# Patient Record
Sex: MALE | Employment: OTHER | URBAN - METROPOLITAN AREA
[De-identification: names, ages, dates, MRNs, and addresses within clinical notes are randomized per-mention and may not be internally consistent; named-entity substitution may affect disease eponyms.]

---

## 2019-11-19 ENCOUNTER — TELEPHONE (OUTPATIENT)
Dept: RHEUMATOLOGY | Age: 29
End: 2019-11-19

## 2019-11-19 NOTE — TELEPHONE ENCOUNTER
I called and confirmed with the wife on 11/19/2019 for their next day 11/20/2019 appointment to arrive 30 minutes before their appointment to fill out office paperwork patient was also told to please brings records. SDH.

## 2019-11-20 ENCOUNTER — OFFICE VISIT (OUTPATIENT)
Dept: RHEUMATOLOGY | Age: 29
End: 2019-11-20

## 2019-11-20 VITALS
DIASTOLIC BLOOD PRESSURE: 79 MMHG | SYSTOLIC BLOOD PRESSURE: 116 MMHG | HEART RATE: 93 BPM | BODY MASS INDEX: 24.36 KG/M2 | TEMPERATURE: 98.5 F | RESPIRATION RATE: 18 BRPM | HEIGHT: 71 IN | WEIGHT: 174 LBS

## 2019-11-20 DIAGNOSIS — M45.5 ANKYLOSING SPONDYLITIS OF THORACOLUMBAR REGION (HCC): Primary | ICD-10-CM

## 2019-11-20 DIAGNOSIS — H30.92 LEFT POSTERIOR UVEITIS: ICD-10-CM

## 2019-11-20 PROBLEM — M45.6 ANKYLOSING SPONDYLITIS OF LUMBAR REGION (HCC): Status: ACTIVE | Noted: 2019-11-20

## 2019-11-20 RX ORDER — IBUPROFEN 200 MG
400 TABLET ORAL
COMMUNITY
End: 2020-02-20

## 2019-11-20 NOTE — PROGRESS NOTES
Chief Complaint   Patient presents with    Other     Ankylosing spondylitis     Pt is a new patient to our office.

## 2019-11-20 NOTE — PATIENT INSTRUCTIONS
Adalimumab (By injection)   Adalimumab (aj-fy-RDZ-ue-mab)  Treats arthritis, plaque psoriasis, ankylosing spondylitis, Crohn disease, ulcerative colitis, hidradenitis suppurativa, and uveitis. Brand Name(s): Humira   There may be other brand names for this medicine. When This Medicine Should Not Be Used: This medicine is not right for everyone. Do not use it if you had an allergic reaction to adalimumab. How to Use This Medicine:   Injectable  · Your doctor will prescribe your exact dose and tell you how often it should be given. This medicine is given as a shot under your skin. · A nurse or other health provider will give you this medicine. · You may be taught how to give your medicine at home. Make sure you understand all instructions before giving yourself an injection. Do not use more medicine or use it more often than your doctor tells you to. · You will be shown the body areas where this shot can be given. Use a different body area each time you give yourself a shot. Keep track of where you give each shot to make sure you rotate body areas. Do not inject into skin areas that are red, bruised, tender, or hard. If you have psoriasis, do not inject into a raised, thick, red, or scaly skin patch or into skin lesions. · This medicine should come with a Medication Guide. Ask your pharmacist for a copy if you do not have one. · Missed dose: Take a dose as soon as you remember. If it is almost time for your next dose, wait until then and take a regular dose. Do not take extra medicine to make up for a missed dose. · If you store this medicine at home, keep it in the refrigerator. Do not freeze. Protect the medicine from light. Keep your medicine and supplies in the original packages until you are ready to use them. Drugs and Foods to Avoid:   Ask your doctor or pharmacist before using any other medicine, including over-the-counter medicines, vitamins, and herbal products.   · Some foods and medicines can affect how adalimumab works. Tell your doctor if you are using any of the following:   ¨ Abatacept, anakinra, azathioprine, cyclosporine, mercaptopurine, rituximab, theophylline  ¨ A blood thinner (including warfarin)  ¨ Medicine that weakens the immune system (including a steroid or cancer medicine)  · This medicine may interfere with vaccines. Ask your doctor before you get a flu shot or any other vaccines. Warnings While Using This Medicine:   · Tell your doctor if you are pregnant or breastfeeding, or if you have liver disease, a history of cancer, COPD, heart failure, diabetes, psoriasis, multiple sclerosis, optic neuritis, problems with your immune system, or a history of Guillain-Barré syndrome. Tell your doctor if you have any type of infection (such as hepatitis B or tuberculosis) or an infection that keeps coming back. · This medicine may cause the following problems:   ¨ Increased risk for infection  ¨ Increased risk of certain cancers, such as lymphoma or leukemia  ¨ New or worsening heart failure  · Tell your doctor if you have a latex allergy. The needle cover of the syringe contains latex and may cause allergic reactions. · You will need to have a skin test for tuberculosis (TB) before you start this medicine. Tell your doctor if you or anyone in your home has ever had a positive TB skin test or been exposed to TB. · This medicine may make you bleed, bruise, or get infections more easily. Take precautions to prevent illness and injury. Wash your hands often. · Your doctor will do lab tests at regular visits to check on the effects of this medicine. Keep all appointments. · Throw away used needles in a hard, closed container that the needles cannot poke through. Keep this container away from children and pets. · Keep all medicine out of the reach of children. Never share your medicine with anyone.   Possible Side Effects While Using This Medicine:   Call your doctor right away if you notice any of these side effects:  · Allergic reaction: Itching or hives, swelling in your face or hands, swelling or tingling in your mouth or throat, chest tightness, trouble breathing  · Blistering, peeling, red skin rash, or red, scaly patches on the skin  · Change in how much or how often you urinate, painful urination  · Changes in vision  · Chest pain, uneven heartbeat, trouble breathing  · Cough, fever, chills, runny or stuffy nose, sore throat, and body aches  · Dark urine or pale stools, nausea, vomiting, loss of appetite, stomach pain, yellow skin or eyes  · Numbness, tingling, or burning pain in your hands, arms, legs, or feet, or joint pain  · Rapid weight gain, swelling in your hands, ankles, lower legs, or feet  · Sores or white patches on your lips, mouth, or throat  · Swollen glands in your neck, underarms, or groin  · Unusual bleeding, bruising, tiredness, weakness, or weight loss  If you notice these less serious side effects, talk with your doctor:   · Back pain  · Headache  · Redness, itching, bruising, bleeding, pain, or swelling where the shot was given  If you notice other side effects that you think are caused by this medicine, tell your doctor. Call your doctor for medical advice about side effects. You may report side effects to FDA at 3-317-FDA-4767  © 2017 [employer] Information is for End User's use only and may not be sold, redistributed or otherwise used for commercial purposes. The above information is an  only. It is not intended as medical advice for individual conditions or treatments. Talk to your doctor, nurse or pharmacist before following any medical regimen to see if it is safe and effective for you.

## 2019-11-20 NOTE — PROGRESS NOTES
REASON FOR VISIT    This is the initial evaluation for Mr. Sinclair Mad a 30 y.o.  male for question of a seronegative spondyloarthritis. The patient is self-referred to the [facility]. HISTORY OF PRESENT ILLNESS     I have reviewed and summarized old records from none. In 2012, he developed left eye inflammation and photosensitivity. He saw ophthalmology and was diagnosed with uveitis and was treated with topical steroids. He had blood work and was told may have ankylosing spondylitis. He has not had recurrence of uveitis since then. In 2013, he developed lower back aching pain and stiffness worse in the morning and evening lasting 3 to 4 hours that would wake him up at night. At 5am it wakes up him due to the severity. He has tightness in his middle back lumbar spine associated with pain as well. His symptoms are worse with rest and better with activity and exercise. He thought it was mattress issues, so changed his mattress without relief. Ibuprofen helps    He denies psoriasis    Family history is negative    Therapy History includes:    Current NSAIDs include: iburpofen  Current DMARD include: none  Prior DMARD therapy includes: none  The following DMARDs have been ineffective: none  The following DMARDs were stopped because of side effects: none    REVIEW OF SYSTEMS    A 15 point review of systems was performed and summarized below. The questionnaire was reviewed with the patient and scanned into the patient's medical record.     General: denies recent weight gain, recent weight loss, fatigue, weakness, fever, drenching night sweats  Musculoskeletal: endorses back pain, morning stiffness (lasting 3-4 hours), muscle pain, denies joint pain, joint swelling  Ears: denies ringing in ears, hearing loss, deafness  Eyes: denies pain, light sensitive, redness, blindness, double vision, blurred vision, excess tearing, dryness, foreign body sensation  Mouth: denies sore tongue, oral ulcers, loss of taste, dryness, increased dental caries  Nose: denies nosebleeds, nasal ulcers  Throat: denies food stuck when swallowing, difficulty with swallowing, hoarseness, pain in jaw while chewing  Neck: denies swollen glands, tender glands  Cardiopulmonary: denies pain in chest with deep breaths, pain in chest when lying down, murmurs, sudden changes in heart beat, wheezing, dry cough, productive cough, shortness of breath at rest, shortness of breath on exertion, coughing of blood  Gastrointestinal: denies nausea, heartburn, stomach pain relieved by food, chronic constipation, chronic diarrhea, blood in stools, black stools  Genitourinary: denies vaginal dryness, pain or burning on urination, blood in urine, cloudy urine, penile ulcers  Hematologic: denies anemia, bleeding tendency, blood clots, bleeding gums  Skin: denies easy bruising, hair loss, rash, rash worsened after sun exposure, hives/urticaria, skin thickening, skin tightness, nodules/bumps, color changes of hands or feet in the cold (Raynaud's)  Neurologic: denies numbness or tingling in hands, numbness or tingling in feet, muscle weakness  Psychiatric: denies depression, excessive worries, PTSD, Bipolar  Sleep: endorses poor sleep (8 hours), difficulty staying asleep , denies snoring, apnea, daytime somnolence, difficulty falling asleep    PAST MEDICAL HISTORY    He has a past medical history of Ankylosing spondylitis ([facility] [address] and Uveitis. FAMILY HISTORY    His family history includes Heart Disease in his father; Hypertension in his father; No Known Problems in his brother, mother, sister, and sister. SOCIAL HISTORY    He reports that he has never smoked. He has never used smokeless tobacco. He reports current alcohol use. He reports that he does not use drugs. HEALTH MAINTENANCE    Immunizations    There is no immunization history on file for this patient.     MEDICATIONS    Current Outpatient Medications   Medication Sig Dispense Refill  ibuprofen (MOTRIN) 200 mg tablet Take 400 mg by mouth every eight (8) hours as needed for Pain.  adalimumab (HUMIRA,CF, PEN) 40 mg/0.4 mL pnkt 40 mg by SubCUTAneous route every fourteen (14) days. Indications: rheumatoid arthritis 2 Kit 11      ALLERGIES    No Known Allergies    PHYSICAL EXAMINATION    Visit Vitals  /79   Pulse 93   Temp 98.5 °F (36.9 °C)   Resp 18   Ht 5' 11\" (1.803 m)   Wt 174 lb (78.9 kg)   BMI 24.27 kg/m²     Body mass index is 24.27 kg/m². General: Patient is alert, oriented x 3, not in acute distress    HEENT:   Conjunctiva are not injected and appear moist, oral mucous membranes are moist, there are no ulcers present, there is no alopecia, neck is supple, there is no lymphadenopathy    Cardiovascular:  Heart is regular rate and rhythm, no murmurs. Chest:  Lungs are clear to auscultation bilaterally. Extremities:  Free of clubbing, cyanosis, edema, extremities well perfused. Neurological:  Muscle strength is full in upper and lower extremities. Skin:    Psoriasis:     no  Nail Pitting:     no  Onycholysis:     no  Palmoplantar pustulosis:   no  Acne fulminans:    no  Acne conglobata:    no  Hidradenitis Suppurativa:   no  Dissecting cellulitis of the scalp:  no  Pilonidal sinus:    no  Erythema nodosum:    no    Musculoskeletal:  A comprehensive musculoskeletal exam was performed for all joints of each upper and lower extremity and assessed for swelling, tenderness and range of motion.       Mild thoracic kyphosis  Midlumbar spine tenderness to palpation  No lumbar or SI joint tenderness    SCOTT: negative  Straight Leg Raise: negative    Costochondritis:  no   Synovitis:   no  Dactylitis:   no  Enthesitis:   no    ANTHROPOMETRICS    Occiput to Wall:   2.5 cm (reference 0 cm)  Tragus to Wall (Right):  17 cm (reference <15 cm)  Tragus to Wall (Left):   17 cm (reference <15 cm)  Lateral Lumbar Flexion (Right): 19 cm (reference >10 cm)  Lateral Lumbar Flexion (Left):  14 cm (reference >10 cm)  Cervical Rotation (Right):  80 degrees (reference >70 degrees)  Cervical Rotation (Left):  80 degrees (reference >70 degrees)  Chest Expansion:   10 cm (reference >2 cm)  Intermalleolar distance:  >120 cm (reference >100 cm)  Modified Schober:   7 cm  (reference >=4 cm)    DATA REVIEW    Prior medical records were reviewed and are summarized as below:    Laboratory data: summarized in the HPI    Imaging: summarized in the HPI. ASSESSMENT AND PLAN    1) Ankylosing Spondylitis. (thoracic and lumbar, left uveitis) He has a several year history of inflammatory back pain and one episode of left uveitis. He has been taking ibuprofen with benefit but continues to have active symptoms that wake him up night. Biologic therapy with anti-TNF have been shown to significantly improve disease activity and function in ankylosing spondylitis patients (Jolynn QUEZADA et al. Gemma Levy Rheum Dis. Efficacy of TNF? blockers in patients with ankylosing spondylitis and non-radiographic axial spondyloarthritis: a meta-analysis. 2015;74(6):1241). I discussed with him about advancing therapy to a biologic (anti-TNF). We discussed the potential adverse effects, which include infections, such as upper respiratory infections, latent TB reactivation, viral hepatitis activation, and routes of administration (infusion versus subcutaneous). The patient was informed about the low risk for lymphoma based on at least 5 years of post-market data and the likely inherent relation between chronic autoimmune diseases, such as Rheumatoid Arthritis, and lymphoma. The patient was informed these medications co-pay are subject to the patient's insurance coverage and we will not know until it has been submitted to the insurance company. Due to his history of uveitis, I recommended Humira. I ordered labs and radiographs today. I referred him to physical therapy. 2) Left Uveitis. This was not an active issue today.     3) Long Term Use of Immunosuppressants. The patient remains on immunomodulatory medications (Humira) and requires frequent toxicity monitoring by blood work. The patient voiced understanding of the aforementioned assessment and plan. Summary of plan was provided in the After Visit Summary patient instructions. I also provided education about MyChart setup and utility.     TODAY'S ORDERS    Orders Placed This Encounter    QUANTIFERON-TB GOLD PLUS    XR SPINE LUMB 2 OR 3 V    XR SPINE THORAC 2 V    XR SI JTS MIN 3 V    CHRONIC HEPATITIS PANEL    PROTEIN ELECTROPHORESIS W/ REFLX RANDI    CBC WITH AUTOMATED DIFF    METABOLIC PANEL, COMPREHENSIVE    C REACTIVE PROTEIN, QT    SED RATE (ESR)    URIC ACID    REFERRAL TO PHYSICAL THERAPY    adalimumab (HUMIRA,CF, PEN) 40 mg/0.4 mL pnkt     Future Appointments   Date Time Provider Andre Cony   2/20/2020  3:20 PM MD Jose Alfredo Cantrell MD, [address] [place] [address]    Adult Rheumatology   Rheumatology Ultrasound Certified  [address]  [facility], [address] [place] [address]   Phone [phone]  Fax [phone]

## 2019-11-24 LAB
ALBUMIN SERPL ELPH-MCNC: 3.9 G/DL (ref 2.9–4.4)
ALBUMIN SERPL-MCNC: 4.6 G/DL (ref 3.5–5.5)
ALBUMIN/GLOB SERPL: 1.1 {RATIO} (ref 0.7–1.7)
ALBUMIN/GLOB SERPL: 1.6 {RATIO} (ref 1.2–2.2)
ALP SERPL-CCNC: 79 IU/L (ref 39–117)
ALPHA1 GLOB SERPL ELPH-MCNC: 0.2 G/DL (ref 0–0.4)
ALPHA2 GLOB SERPL ELPH-MCNC: 0.7 G/DL (ref 0.4–1)
ALT SERPL-CCNC: 43 IU/L (ref 0–44)
AST SERPL-CCNC: 27 IU/L (ref 0–40)
B-GLOBULIN SERPL ELPH-MCNC: 1.4 G/DL (ref 0.7–1.3)
BASOPHILS # BLD AUTO: 0 X10E3/UL (ref 0–0.2)
BASOPHILS NFR BLD AUTO: 0 %
BILIRUB SERPL-MCNC: 0.3 MG/DL (ref 0–1.2)
BUN SERPL-MCNC: 12 MG/DL (ref 6–20)
BUN/CREAT SERPL: 13 (ref 9–20)
CALCIUM SERPL-MCNC: 9.6 MG/DL (ref 8.7–10.2)
CHLORIDE SERPL-SCNC: 101 MMOL/L (ref 96–106)
CO2 SERPL-SCNC: 23 MMOL/L (ref 20–29)
COMMENT, 144067: NORMAL
CREAT SERPL-MCNC: 0.92 MG/DL (ref 0.76–1.27)
CRP SERPL-MCNC: 2 MG/L (ref 0–10)
EOSINOPHIL # BLD AUTO: 0.3 X10E3/UL (ref 0–0.4)
EOSINOPHIL NFR BLD AUTO: 4 %
ERYTHROCYTE [DISTWIDTH] IN BLOOD BY AUTOMATED COUNT: 12.8 % (ref 12.3–15.4)
ERYTHROCYTE [SEDIMENTATION RATE] IN BLOOD BY WESTERGREN METHOD: 10 MM/HR (ref 0–15)
GAMMA GLOB SERPL ELPH-MCNC: 1.4 G/DL (ref 0.4–1.8)
GAMMA INTERFERON BACKGROUND BLD IA-ACNC: 0.01 IU/ML
GLOBULIN SER CALC-MCNC: 2.9 G/DL (ref 1.5–4.5)
GLOBULIN SER CALC-MCNC: 3.6 G/DL (ref 2.2–3.9)
GLUCOSE SERPL-MCNC: 91 MG/DL (ref 65–99)
HBV CORE AB SERPL QL IA: NEGATIVE
HBV CORE IGM SERPL QL IA: NEGATIVE
HBV E AB SERPL QL IA: NEGATIVE
HBV E AG SERPL QL IA: NEGATIVE
HBV SURFACE AB SER QL: REACTIVE
HBV SURFACE AG SERPL QL IA: NEGATIVE
HCT VFR BLD AUTO: 44.7 % (ref 37.5–51)
HCV AB S/CO SERPL IA: <0.1 S/CO RATIO (ref 0–0.9)
HGB BLD-MCNC: 15.2 G/DL (ref 13–17.7)
IMM GRANULOCYTES # BLD AUTO: 0 X10E3/UL (ref 0–0.1)
IMM GRANULOCYTES NFR BLD AUTO: 0 %
LYMPHOCYTES # BLD AUTO: 2.3 X10E3/UL (ref 0.7–3.1)
LYMPHOCYTES NFR BLD AUTO: 36 %
M PROTEIN SERPL ELPH-MCNC: ABNORMAL G/DL
M TB IFN-G BLD-IMP: NEGATIVE
M TB IFN-G CD4+ BCKGRND COR BLD-ACNC: 0.01 IU/ML
MCH RBC QN AUTO: 30.1 PG (ref 26.6–33)
MCHC RBC AUTO-ENTMCNC: 34 G/DL (ref 31.5–35.7)
MCV RBC AUTO: 89 FL (ref 79–97)
MITOGEN IGNF BLD-ACNC: >10 IU/ML
MONOCYTES # BLD AUTO: 0.5 X10E3/UL (ref 0.1–0.9)
MONOCYTES NFR BLD AUTO: 7 %
NEUTROPHILS # BLD AUTO: 3.4 X10E3/UL (ref 1.4–7)
NEUTROPHILS NFR BLD AUTO: 53 %
PLATELET # BLD AUTO: 296 X10E3/UL (ref 150–450)
PLEASE NOTE, 011150: ABNORMAL
POTASSIUM SERPL-SCNC: 4.5 MMOL/L (ref 3.5–5.2)
PROT PATTERN SERPL ELPH-IMP: ABNORMAL
PROT SERPL-MCNC: 7.5 G/DL (ref 6–8.5)
QUANTIFERON INCUBATION, QF1T: NORMAL
QUANTIFERON TB2 AG: 0.01 IU/ML
RBC # BLD AUTO: 5.05 X10E6/UL (ref 4.14–5.8)
SERVICE CMNT-IMP: NORMAL
SODIUM SERPL-SCNC: 140 MMOL/L (ref 134–144)
URATE SERPL-MCNC: 6.9 MG/DL (ref 3.7–8.6)
WBC # BLD AUTO: 6.4 X10E3/UL (ref 3.4–10.8)

## 2019-12-02 ENCOUNTER — HOSPITAL ENCOUNTER (OUTPATIENT)
Dept: GENERAL RADIOLOGY | Age: 29
Discharge: HOME | End: 2019-12-02
Payer: COMMERCIAL

## 2019-12-02 DIAGNOSIS — M45.5 ANKYLOSING SPONDYLITIS OF THORACOLUMBAR REGION (HCC): ICD-10-CM

## 2019-12-02 PROCEDURE — 72100 X-RAY EXAM L-S SPINE 2/3 VWS: CPT

## 2019-12-02 PROCEDURE — 72202 X-RAY EXAM SI JOINTS 3/> VWS: CPT

## 2019-12-02 PROCEDURE — 72070 X-RAY EXAM THORAC SPINE 2VWS: CPT

## 2019-12-10 ENCOUNTER — DOCUMENTATION ONLY (OUTPATIENT)
Dept: PHARMACY | Age: 29
End: 2019-12-10

## 2019-12-10 NOTE — PROGRESS NOTES
[facility] [address] [facility] ER Update    Date: 12/10/19    Kyle Silva 1990     Medication: Humira CF 40mg/0.4mL pen    Co-pay = $192 ($0 with Manufacture Credit Card)    Fill: 11/27/19    Picked up in the pharmacy: 12/2/19    Next Fill Due: 12/18/19    Pharmacist counseled the patient on:        - Use  - Dosing  - Administration  - Side effects    Handout was provided.     Radha Soto, [address] at [facility], [address] [place] [address], [address]  phone: [phone]   fax: [phone]

## 2020-01-30 ENCOUNTER — DOCUMENTATION ONLY (OUTPATIENT)
Dept: PHARMACY | Age: 30
End: 2020-01-30

## 2020-01-30 NOTE — PROGRESS NOTES
[facility] at [address] [facility] Update     Date: 1/27/2020     Santinoisauro Donovan 1990      Medication: Humira CF 40mg/0.4mL pen     Co-pay = $5     Fill: 1/27/2020     Picked up in the pharmacy: 1/27/2020     Next Fill Due: 2/17/2020     Pharmacist offered counseling.     Sushant Burnett, [address] at [facility],  [place], [address]  phone: [phone]   fax: [phone]

## 2020-02-20 ENCOUNTER — OFFICE VISIT (OUTPATIENT)
Dept: RHEUMATOLOGY | Age: 30
End: 2020-02-20

## 2020-02-20 VITALS
HEART RATE: 79 BPM | TEMPERATURE: 98 F | DIASTOLIC BLOOD PRESSURE: 70 MMHG | WEIGHT: 176 LBS | HEIGHT: 71 IN | RESPIRATION RATE: 18 BRPM | SYSTOLIC BLOOD PRESSURE: 109 MMHG | BODY MASS INDEX: 24.64 KG/M2

## 2020-02-20 DIAGNOSIS — M45.5 ANKYLOSING SPONDYLITIS OF THORACOLUMBAR REGION (HCC): Primary | ICD-10-CM

## 2020-02-20 DIAGNOSIS — H30.92 LEFT POSTERIOR UVEITIS: ICD-10-CM

## 2020-02-20 DIAGNOSIS — Z79.60 LONG-TERM USE OF IMMUNOSUPPRESSANT MEDICATION: ICD-10-CM

## 2020-02-20 NOTE — PROGRESS NOTES
Chief Complaint   Patient presents with    Other     Ankylosing spondylitis     1. Have you been to the ER, urgent care clinic since your last visit? Hospitalized since your last visit? No    2. Have you seen or consulted any other health care providers outside of the [address] [place] [address] since your last visit? Include any pap smears or colon screening.  No

## 2020-02-20 NOTE — Clinical Note
2/20/20 Patient: Tasha Carlton YOB: 1990 Date of Visit: 2/20/2020 None None ([phone]) Patient Stated That They Have No Pcp 
VIA Dear None, Thank you for referring Mr. Tasha Carlton to [facility] for evaluation. My notes for this consultation are attached. If you have questions, please do not hesitate to call me. I look forward to following your patient along with you.  
 
 
Sincerely, 
 
Vita Weinstein MD

## 2020-02-20 NOTE — PROGRESS NOTES
REASON FOR VISIT    This is a follow-up visit for Mr. Isaak Varela for     ICD-10-CM   1. Ankylosing spondylitis of thoracolumbar region ([facility] M45.5   2. Left posterior uveitis H30.92     Spondyloarthritis phenotype includes:  Anti-CCP positive: N/A  Rheumatoid factor positive: N/A  HLA-B27 positive: N/A  Inflammatory Back Pain: yes  Erosive disease: no  Sacroiliitis: no  Ankylosis: no  Psoriasis: no  Enthesitis: no  Dactylitis: no  Nail Pitting: no  Onycholysis: no  Splinter Hemorrhage: no  Extra-articular manifestations include: uveitis  SAPHO: no    Immunosuppression Screening (11/20/2019): Quantiferon TB: negative  PPD:  Not performed  Hepatitis B: negative  Hepatitis C: negative    Therapy History includes:  Prior NSAIDs include: ibuprofen  Current NSAIDs include: none  Current DMARD therapy include: Humira 40 mg every other Tuesday (12/10/2019 to present)  Prior DMARD therapy include: None  Discontinued DMARDs because of inefficacy: None  Discontinued DMARDs because of side effects: None    Active problems include:    Patient Active Problem List   Diagnosis Code    Ankylosing spondylitis of thoracolumbar region ([facility] M45.5    Left posterior uveitis H30.92     HISTORY OF PRESENT ILLNESS    Mr. Isaak Varela returns for a follow-up visit. On his last visit, I started Humira 40 mg every other week for ankylosing spondylitis with uveitis, which he has taken with good tolerance. He denies breakthrough. I also referred him to physical therapy but he did not go. Today, he feels great. He denies back pain or stiffness. He no longer waking up at night due to pain. He denies interval uveitis flares. He no longer needs ibuprofen.      He denies fever, weight loss, blurred vision, vision loss, oral ulcers, ankle swelling, dry cough, dyspnea, nausea, vomiting, dysphagia, abdominal pain, black or bloody stool, fall since last visit, rash, easy bruising and increased thirst.    Mr. Isaak Varela has continued his medications for arthritis and reports good tolerance without significant side effects. Last toxicity monitoring by blood work was done on 11/20/2019 and did not reveal any significant adverse effects. Most recent inflammatory markers from 11/20/2019 revealed a ESR 10 mm/hr and CRP 2 mg/L. The patient has not had any interval hospital admissions, infections, or surgeries. REVIEW OF SYSTEMS    A comprehensive review of systems was performed and pertinent results are documented in the HPI, review of systems is otherwise non-contributory. PAST MEDICAL HISTORY    He has a past medical history of Ankylosing spondylitis ([address] and Uveitis. FAMILY HISTORY    His family history includes Heart Disease in his father; Hypertension in his father; No Known Problems in his brother, mother, sister, and sister. SOCIAL HISTORY    He reports that he has never smoked. He has never used smokeless tobacco. He reports current alcohol use. He reports that he does not use drugs. IMMUNIZATIONS    There is no immunization history on file for this patient. MEDICATIONS    Current Outpatient Medications   Medication Sig Dispense Refill    adalimumab (HUMIRA,CF, PEN) 40 mg/0.4 mL pnkt 40 mg by SubCUTAneous route every fourteen (14) days. Indications: rheumatoid arthritis 2 Kit 11        ALLERGIES    No Known Allergies    PHYSICAL EXAMINATION    Visit Vitals  /70   Pulse 79   Temp 98 °F (36.7 °C)   Resp 18   Ht 5' 11\" (1.803 m)   Wt 176 lb (79.8 kg)   BMI 24.55 kg/m²     Body mass index is 24.55 kg/m². General: Patient is alert, oriented x 3, not in acute distress    HEENT:   Sclerae are not injected and appear moist.  There is no alopecia. Cardiovascular:  Heart is regular rate and rhythm, no murmurs. Chest:  Lungs are clear to auscultation bilaterally. No rhonchi, wheezes, or crackles.     Extremities:  Free of clubbing, cyanosis, edema    Neurological exam:  Muscle strength is full in upper and lower extremities Skin:    Psoriasis:     no  Nail Pitting:     no  Onycholysis:     no  Splinter Hemorrhage:   no  Palmoplantar pustulosis:   no  Acne fulminans:    no  Acne conglobata:    no  Hidradenitis Suppurativa:   no  Dissecting cellulitis of the scalp:  no  Pilonidal sinus:    no  Erythema nodosum:    no    Musculoskeletal:  A comprehensive musculoskeletal exam was performed for all joints of each upper and lower extremity and assessed for swelling, tenderness and range of motion.       Mild thoracic kyphosis  Midlumbar spine tenderness to palpation  No lumbar or SI joint tenderness     SCOTT: negative  Straight Leg Raise: negative     Costochondritis:  no   Synovitis:   no  Dactylitis:   no  Enthesitis:   No    ANTHROPOMETRICS   (11/20/2019)    Occiput to Wall:                                   2.5 cm (reference 0 cm)  Tragus to Wall (Right):                        17 cm (reference <15 cm)  Tragus to Wall (Left):                          17 cm (reference <15 cm)  Lateral Lumbar Flexion (Right):          19 cm (reference >10 cm)  Lateral Lumbar Flexion (Left):                        14 cm (reference >10 cm)  Cervical Rotation (Right):                   80 degrees (reference >70 degrees)  Cervical Rotation (Left):                      80 degrees (reference >70 degrees)  Chest Expansion:                                10 cm (reference >2 cm)  Intermalleolar distance:                      >120 cm (reference >100 cm)  Modified Schober:                               7 cm  (reference >=4 cm)      ANTHROPOMETRICS  (2/20/2020)    Occiput to Wall:   12 cm (reference 0 cm)  Tragus to Wall (Right):  12 cm (reference <15 cm)  Tragus to Wall (Left):   0 cm (reference <15 cm)  Lateral Lumbar Flexion (Right): 23 cm (reference >10 cm)  Lateral Lumbar Flexion (Left):  23 cm (reference >10 cm)  Cervical Rotation (Right):  90 degrees (reference >70 degrees)  Cervical Rotation (Left):  90 degrees (reference >70 degrees)  Chest Expansion:   10 cm (reference >2 cm)  Intermalleolar distance:  120 cm (reference >100 cm)  Modified Schober:   7 cm  (reference >=4 cm)      DATA REVIEW    Laboratory     Recent laboratory results were reviewed, summarized, and discussed with the patient. Imaging    Musculoskeletal Ultrasound    None    Radiographs    Thoracic Spine 12/02/2019: There is no acute fracture or subluxation. Vertebral body heights and intervertebral disc spaces are maintained. There are no osteophytes or syndesmophytes. There is no abnormality in alignment. Lumbar Spine 12/02/2019: There is no acute fracture or subluxation. Vertebral body heights and intervertebral disc spaces are maintained. There are no osteophytes or syndesmophytes. There is no abnormality in alignment. Sacroiliac Joint 12/02/2019: no abnormality.        CT Imaging    None    MR Imaging    None    DXA     None    ASSESSMENT AND PLAN    This is a follow-up visit for Mr. Matilde Tuttle. 1) Ankylosing Spondylitis. (thoracic and lumbar, left uveitis) He is doing well on Humira 40 mg every 14 days with good tolerance and no breakthrough. He no longer has inflammantory back pain. He has not had any epidoes of uveitis. His anthropometrics have improved. I will continue treatment. I had referred him to physical therapy but he did not go. 2) Left Uveitis. This was not an active issue today. 3) Long Term Use of Immunosuppressants. The patient remains on immunomodulatory medications (Humira) and requires frequent toxicity monitoring by blood work. The patient voiced understanding of the aforementioned assessment and plan. Summary of plan was provided in the After Visit Summary patient instructions. TODAY'S ORDERS    No orders of the defined types were placed in this encounter.     Future Appointments   Date Time Provider Department Center   8/20/2020  3:20 PM Marely Madrigal MD [place], MD, [address] [facility]    Adult Rheumatology Rheumatology Ultrasound Certified  [facility]  A Part of [facility], [address]   Phone [phone]  Fax [phone]

## 2020-02-21 ENCOUNTER — DOCUMENTATION ONLY (OUTPATIENT)
Dept: PHARMACY | Age: 30
End: 2020-02-21

## 2020-02-21 NOTE — PROGRESS NOTES
[facility] at [address] [facility] Update     Date: 2/17/2020     Kristine Mikey Graham 1990      Medication: Humira CF 40mg/0.4mL pen     Co-pay = $5     Fill: 2/19/2020     Picked up in the pharmacy: 2/19/2020     Next Fill Due: 3/9/2020     Pharmacist offered counseling.     Mer Diallo, [address] at [facility], [address] [place] [address], [address]  phone: [phone]   fax: [phone]

## 2020-03-13 ENCOUNTER — DOCUMENTATION ONLY (OUTPATIENT)
Dept: PHARMACY | Age: 30
End: 2020-03-13

## 2020-03-13 NOTE — PROGRESS NOTES
[facility] at [address] [facility] Update     Date: 3/12/2020     Cisco Graham 1990      Medication: Humira CF 40mg/0.4mL pen     Co-pay = $5     Fill: 3/9/2020     Ship: 3/11/2020    Deliver: 3/12/2020     Next Fill Due: 3/30/2020     Pharmacist offered counseling.     Armando Kraus, [address] [facility] at [facility],  Mansi Tavera   [address] [place] [address] Avenue  phone: [phone]   fax: [phone]

## 2020-04-03 ENCOUNTER — DOCUMENTATION ONLY (OUTPATIENT)
Dept: PHARMACY | Age: 30
End: 2020-04-03

## 2020-04-03 NOTE — PROGRESS NOTES
[facility] at [address] [facility] Update     Date: 4/1/2020     Karla Graham 1990      Medication: Humira CF 40mg/0.4mL pen     Co-pay = $5     Fill: 3/26/2020     Ship: 3/31/2020     Deliver: 4/1/2020     Next Fill Due: 4/19/2020     Pharmacist offered counseling.     Negrito Olmedo, [address] at [employer] [address] [place], [facility] [address]  phone: [phone]   fax: [phone]

## 2020-05-06 ENCOUNTER — DOCUMENTATION ONLY (OUTPATIENT)
Dept: PHARMACY | Age: 30
End: 2020-05-06

## 2020-05-06 NOTE — PROGRESS NOTES
[facility] at [address] [facility] Update     Date: 4/28/2020     Marry Graham 1990      Medication: Humira CF 40mg/0.4mL pen     Co-pay = $192 (used [employer] Card)     Fill: 4/16/2020     Ship: 4/27/2020     Deliver: 4/28/2020     Next Fill Due: 5/18/2020     Pharmacist offered counseling.     Tiffany Calderon, [address] at [facility], [address] [place] [address], [address]  phone: [phone]   fax: [phone]

## 2020-06-04 ENCOUNTER — DOCUMENTATION ONLY (OUTPATIENT)
Dept: PHARMACY | Age: 30
End: 2020-06-04

## 2020-07-06 ENCOUNTER — DOCUMENTATION ONLY (OUTPATIENT)
Dept: PHARMACY | Age: 30
End: 2020-07-06

## 2020-07-06 NOTE — PROGRESS NOTES
[facility] at [address] [facility] Update     Date: 6/30/2020     Socorromeg Barronclaude Graham 1990      Medication: Humira CF 40mg/0.4mL pen     Co-pay = $192 (used [employer] Credit Card)     Fill: 6/19/2020     Ship: 6/29/2020     Deliver: 6/30/2020     Next Fill Due: 7/20/2020     Pharmacist offered counseling.     Toya Kapoor, [address] at [facility],  [place], [address]  phone: [phone]   fax: [phone]

## 2020-07-23 ENCOUNTER — DOCUMENTATION ONLY (OUTPATIENT)
Dept: PHARMACY | Age: 30
End: 2020-07-23

## 2020-07-23 NOTE — PROGRESS NOTES
[facility] at [address] [facility] Update     Date: 7/21/2020     Willaim Curling Porter 1990      Medication: Humira CF 40mg/0.4mL pen     Co-pay = $192 (used [employer] Credit Card)     Fill: 7/20/2020     Ship: 7/20/2020     Deliver: 7/21/2020     Next Fill Due: 8/10/2020     Pharmacist offered counseling.     Danica Mueller, [address] at [employer] [address],  [place], [address]  phone: [phone]   fax: [phone]

## 2020-08-14 ENCOUNTER — DOCUMENTATION ONLY (OUTPATIENT)
Dept: PHARMACY | Age: 30
End: 2020-08-14

## 2020-08-14 NOTE — PROGRESS NOTES
[facility] at [address] [facility] Update     Date: 8/14/2020     Adi Graham 1990      Medication: Humira CF 40mg/0.4mL pen     Co-pay = $192 (used [employer] Credit Card)     Fill: 8/10/2020     Ship: 8/13/2020     Deliver: 8/14/2020     Next Fill Due: 9/7/2020     Pharmacist offered counseling.     Maren Frankel, [address] at [facility],  [place], [address]  phone: [phone]   fax: [phone]

## 2020-09-14 DIAGNOSIS — H30.92 LEFT POSTERIOR UVEITIS: ICD-10-CM

## 2020-09-14 DIAGNOSIS — M45.5 ANKYLOSING SPONDYLITIS OF THORACOLUMBAR REGION (HCC): ICD-10-CM

## 2020-09-14 RX ORDER — ADALIMUMAB 40MG/0.4ML
KIT SUBCUTANEOUS
Qty: 1 KIT | Refills: 11 | Status: SHIPPED | OUTPATIENT
Start: 2020-09-14 | End: 2021-08-19

## 2020-09-15 ENCOUNTER — DOCUMENTATION ONLY (OUTPATIENT)
Dept: PHARMACY | Age: 30
End: 2020-09-15

## 2020-10-16 ENCOUNTER — DOCUMENTATION ONLY (OUTPATIENT)
Dept: PHARMACY | Age: 30
End: 2020-10-16

## 2020-10-16 NOTE — PROGRESS NOTES
[facility] at [address] [facility] Update     Date: 10/6/2020     Eileen Graham 1990      Medication: Humira CF 40mg/0.4mL pen     Co-pay = $192 (used [employer] Credit Card)     Fill: 9/28/2020     Ship: 10/5/2020     Deliver: 10/6/2020     Next Fill Due: 10/26/2020     Pharmacist offered counseling.     Tatum Cleaning, [address] at [facility],  [place], [address]  phone: [phone]   fax: [phone]

## 2020-11-04 ENCOUNTER — DOCUMENTATION ONLY (OUTPATIENT)
Dept: PHARMACY | Age: 30
End: 2020-11-04

## 2020-11-04 NOTE — PROGRESS NOTES
[facility] at [address] [facility] Update     Date: 10/27/2020     Augustin Graham 1990      Medication: Humira CF 40mg/0.4mL pen     Co-pay = $192 (used [employer] Credit Card)     Fill: 10/26/2020     Ship: 10/26/2020     Deliver: 10/27/2020     Next Fill Due: 11/16/2020     Pharmacist offered counseling.     Tanvir Jimenez, [address] at [facility],  [place], [address]  phone: [phone]   fax: [phone]

## 2020-11-13 ENCOUNTER — VIRTUAL VISIT (OUTPATIENT)
Dept: RHEUMATOLOGY | Age: 30
End: 2020-11-13
Payer: COMMERCIAL

## 2020-11-13 DIAGNOSIS — M54.9 MECHANICAL BACK PAIN: ICD-10-CM

## 2020-11-13 DIAGNOSIS — M45.5 ANKYLOSING SPONDYLITIS OF THORACOLUMBAR REGION (HCC): Primary | ICD-10-CM

## 2020-11-13 DIAGNOSIS — H30.92 LEFT POSTERIOR UVEITIS: ICD-10-CM

## 2020-11-13 DIAGNOSIS — Z79.60 LONG-TERM USE OF IMMUNOSUPPRESSANT MEDICATION: ICD-10-CM

## 2020-11-13 PROCEDURE — 99215 OFFICE O/P EST HI 40 MIN: CPT | Performed by: INTERNAL MEDICINE

## 2020-11-13 NOTE — PROGRESS NOTES
REASON FOR VISIT    This is a follow-up visit for Mr. Vira Hu for     ICD-10-CM   1. Ankylosing spondylitis of thoracolumbar region ([facility] M45.5   2. Left posterior uveitis H30.92     The patient has consented for synchronous (real-time) Telemedicine (audio-video technology) on 11/13/2020 for their care to be delivered over telemedicine in place of their regularly scheduled office visit pursuant to the emergency declaration under the [address] [place] [address] waiver authority and the [employer] Act, this Virtual  Visit was conducted, with patient's consent, to reduce the patient's risk of exposure to COVID-19 and provide continuity of care for an established patient. Services were provided through a video synchronous discussion virtually to substitute for in-person clinic visit. Spondyloarthritis phenotype includes:  Anti-CCP positive: N/A  Rheumatoid factor positive: N/A  HLA-B27 positive: N/A  Inflammatory Back Pain: yes  Erosive disease: no  Sacroiliitis: no  Ankylosis: no  Psoriasis: no  Enthesitis: no  Dactylitis: no  Nail Pitting: no  Onycholysis: no  Splinter Hemorrhage: no  Extra-articular manifestations include: uveitis  SAPHO: no    Immunosuppression Screening (11/20/2019): Quantiferon TB: negative  PPD:  Not performed  Hepatitis B: negative  Hepatitis C: negative    Therapy History includes:  Prior NSAIDs include: ibuprofen  Current NSAIDs include: none  Current DMARD therapy include: Humira 40 mg every other Tuesday (12/10/2019 to present)  Prior DMARD therapy include: None  Discontinued DMARDs because of inefficacy: None  Discontinued DMARDs because of side effects: None    HISTORY OF PRESENT ILLNESS    Mr. Vira Hu returns for a follow-up visit. On his last visit, I continued Humira 40 mg every other week, which he has taken with good tolerance. He denies breakthrough. He denies breakthrough.  At times, he may have an injection site reaction. Today, he feels well. He had a new baby (boy) 10 months old. He denies morning low back pain or stiffness  But he has noticed an increase in low back this week. He is not sure if that was due to the weather or shoveling gravel last week. He has pain in her left outer hip. He denies interval uveitis flares. He has not been working out at [employer] and has been more sedentary. He denies fever, weight loss, blurred vision, vision loss, oral ulcers, ankle swelling, dry cough, dyspnea, nausea, vomiting, dysphagia, abdominal pain, black or bloody stool, fall since last visit, rash, easy bruising and increased thirst.    Last toxicity monitoring by blood work was done on 11/20/2019 and did not reveal any significant adverse effects. Most recent inflammatory markers from 11/20/2019 revealed a ESR 10 mm/hr and CRP 2 mg/L. The patient has not had any interval hospital admissions, infections, or surgeries. REVIEW OF SYSTEMS    A comprehensive review of systems was performed and pertinent results are documented in the HPI, review of systems is otherwise non-contributory. PAST MEDICAL HISTORY    He has a past medical history of Ankylosing spondylitis ([address] and Uveitis. FAMILY HISTORY    His family history includes Heart Disease in his father; Hypertension in his father; No Known Problems in his brother, mother, sister, and sister. SOCIAL HISTORY    He reports that he has never smoked. He has never used smokeless tobacco. He reports current alcohol use. He reports that he does not use drugs. MEDICATIONS    Current Outpatient Medications   Medication Sig    Humira,HENRIQUE, Pen 40 mg/0.4 mL injection pen INJECT 1 PEN (40 MG) UNDER THE SKIN EVERY 14 DAYS     No current facility-administered medications for this visit. ALLERGIES    No Known Allergies    PHYSICAL EXAMINATION    There were no vitals taken for this visit.   There is no height or weight on file to calculate BMI.    General: Patient is alert, oriented x 3, not in acute distress    HEENT:   Sclerae are not injected and appear moist.  There is no alopecia. Chest:  Breathing comfortably at room air    Skin:    Exam deferred  Previous Exam    Psoriasis:     no  Nail Pitting:     no  Onycholysis:     no  Splinter Hemorrhage:   no  Palmoplantar pustulosis:   no  Acne fulminans:    no  Acne conglobata:    no  Hidradenitis Suppurativa:   no  Dissecting cellulitis of the scalp:  no  Pilonidal sinus:    no  Erythema nodosum:    no    Musculoskeletal:  A comprehensive musculoskeletal exam was NOT performed for all joints of each upper and lower extremity and assessed for swelling, tenderness and range of motion.       Exam deferred  Previous Exam    Mild thoracic kyphosis  Midlumbar spine tenderness to palpation  No lumbar or SI joint tenderness     SCOTT: negative  Straight Leg Raise: negative     Costochondritis:  no   Synovitis:   no  Dactylitis:   no  Enthesitis:   No    ANTHROPOMETRICS   (11/20/2019)    Occiput to Wall:                                   2.5 cm (reference 0 cm)  Tragus to Wall (Right):                        17 cm (reference <15 cm)  Tragus to Wall (Left):                          17 cm (reference <15 cm)  Lateral Lumbar Flexion (Right):          19 cm (reference >10 cm)  Lateral Lumbar Flexion (Left):                        14 cm (reference >10 cm)  Cervical Rotation (Right):                   80 degrees (reference >70 degrees)  Cervical Rotation (Left):                      80 degrees (reference >70 degrees)  Chest Expansion:                                10 cm (reference >2 cm)  Intermalleolar distance:                      >120 cm (reference >100 cm)  Modified Schober:                               7 cm  (reference >=4 cm)      ANTHROPOMETRICS  (2/20/2020)    Occiput to Wall:   12 cm (reference 0 cm)  Tragus to Wall (Right):  12 cm (reference <15 cm)  Tragus to Wall (Left):   0 cm (reference <15 cm)  Lateral Lumbar Flexion (Right): 23 cm (reference >10 cm)  Lateral Lumbar Flexion (Left):  23 cm (reference >10 cm)  Cervical Rotation (Right):  90 degrees (reference >70 degrees)  Cervical Rotation (Left):  90 degrees (reference >70 degrees)  Chest Expansion:   10 cm (reference >2 cm)  Intermalleolar distance:  120 cm (reference >100 cm)  Modified Schober:   7 cm  (reference >=4 cm)      DATA REVIEW    Laboratory     Recent laboratory results were reviewed, summarized, and discussed with the patient. Imaging    Musculoskeletal Ultrasound    None    Radiographs    Thoracic Spine 12/02/2019: There is no acute fracture or subluxation. Vertebral body heights and intervertebral disc spaces are maintained. There are no osteophytes or syndesmophytes. There is no abnormality in alignment. Lumbar Spine 12/02/2019: There is no acute fracture or subluxation. Vertebral body heights and intervertebral disc spaces are maintained. There are no osteophytes or syndesmophytes. There is no abnormality in alignment. Sacroiliac Joint 12/02/2019: no abnormality.      CT Imaging    None    MR Imaging    None    DXA     None    ASSESSMENT AND PLAN    This is a follow-up visit for Mr. Hinojosa Found. 1) Ankylosing Spondylitis. (thoracic and lumbar, left uveitis) He is maintained on Humira 40 mg every 14 days with good tolerance and no breakthrough. He no longer has inflammantory back pain. He has not had any interval flares of uveitis. He has some mechanical back pain. I will continue treatment. Annual labs will be mailed. 2) Left Uveitis. This was not an active issue today. 3) Long Term Use of Immunosuppressants. The patient remains on immunomodulatory medications (Humira) and requires frequent toxicity monitoring by blood work. Respective labs will be mailed. 4) Mechanical Back Pain. This is overuse. The patient voiced understanding of the aforementioned assessment and plan.      The patient has consented for synchronous (real-time) Telemedicine (audio-video technology) on 11/13/2020 for their care to be delivered over telemedicine in place of their regularly scheduled office visit pursuant to the emergency declaration under the [facility] waiver authority and the [employer] Act, this Virtual  Visit was conducted, with patient's consent, to reduce the patient's risk of exposure to COVID-19 and provide continuity of care for an established patient. Services were provided through a video synchronous discussion virtually to substitute for in-person clinic visit.     TODAY'S ORDERS    Orders Placed This Encounter    QUANTIFERON-TB GOLD PLUS    CHRONIC HEPATITIS PANEL     Future Appointments   Date Time Provider Andre Donnelly   5/5/2021 10:00 AM Gonzalo Madrigal MD [facility]  [address], MD, [address] [place] [address]    Adult Rheumatology   Rheumatology Ultrasound Certified  [address]  [facility], [address] [place] [address]   Phone [phone]  Fax [phone]

## 2020-11-27 ENCOUNTER — DOCUMENTATION ONLY (OUTPATIENT)
Dept: PHARMACY | Age: 30
End: 2020-11-27

## 2020-11-27 NOTE — PROGRESS NOTES
[facility] at [address] [facility] Update     Date: 11/19/2020     Phyllis Graham 1990      Medication: Humira CF 40mg/0.4mL pen     Co-pay = $192 (used [employer] Credit Card)     Fill: 11/16/2020     Ship: 11/18/2020     Deliver: 11/19/2020     Next Fill Due: 12/14/2020     Pharmacist offered counseling.     Alondra Chapman, [address] at [place] [address] [place], [address]  phone: [phone]   fax: [phone]

## 2021-05-05 ENCOUNTER — VIRTUAL VISIT (OUTPATIENT)
Dept: RHEUMATOLOGY | Age: 31
End: 2021-05-05
Payer: COMMERCIAL

## 2021-05-05 DIAGNOSIS — H30.92 LEFT POSTERIOR UVEITIS: ICD-10-CM

## 2021-05-05 DIAGNOSIS — Z79.60 LONG-TERM USE OF IMMUNOSUPPRESSANT MEDICATION: ICD-10-CM

## 2021-05-05 DIAGNOSIS — M45.5 ANKYLOSING SPONDYLITIS OF THORACOLUMBAR REGION (HCC): Primary | ICD-10-CM

## 2021-05-05 PROCEDURE — 99214 OFFICE O/P EST MOD 30 MIN: CPT | Performed by: INTERNAL MEDICINE

## 2021-05-05 NOTE — PROGRESS NOTES
REASON FOR VISIT    This is a follow-up visit for Mr. Zeynep Downs for     ICD-10-CM   1. Ankylosing spondylitis of thoracolumbar region ([facility] [address] M45.5   2. Left posterior uveitis H30.92     The patient has consented for synchronous (real-time) Telemedicine (audio-video technology) on 5/5/2021 for their care to be delivered over telemedicine in place of their regularly scheduled office visit pursuant to the emergency declaration under the [address] [place] [address] waiver authority and the [employer] Act, this Virtual  Visit was conducted, with patient's consent, to reduce the patient's risk of exposure to COVID-19 and provide continuity of care for an established patient. Services were provided through a video synchronous discussion virtually to substitute for in-person clinic visit. Spondyloarthritis phenotype includes:  Anti-CCP positive: N/A  Rheumatoid factor positive: N/A  HLA-B27 positive: N/A  Inflammatory Back Pain: yes  Erosive disease: no  Sacroiliitis: no  Ankylosis: no  Psoriasis: no  Enthesitis: no  Dactylitis: no  Nail Pitting: no  Onycholysis: no  Splinter Hemorrhage: no  Extra-articular manifestations include: uveitis  SAPHO: no    Immunosuppression Screening (11/20/2019): Quantiferon TB: negative  PPD:  Not performed  Hepatitis B: negative  Hepatitis C: negative    Therapy History includes:  Current NSAIDs include: none  Current DMARD therapy include: Humira 40 mg every other Tuesday (12/10/2019 to present)  Prior DMARD therapy include: None  Discontinued DMARDs because of inefficacy: None  Discontinued DMARDs because of side effects: None    HISTORY OF PRESENT ILLNESS    Mr. Zeynep Downs returns for a follow-up visit. On his last visit, I continued Humira 40 mg every other week, which he has taken with good tolerance. He denies breakthrough. He denies breakthrough. I ordered annual labs which were not done.     Today, he feels well. He denies morning low back pain or stiffness  But he has noticed an increase in low back this week. He denies interval uveitis flares. He received his COVID. He denies fever, weight loss, blurred vision, vision loss, oral ulcers, ankle swelling, dry cough, dyspnea, nausea, vomiting, dysphagia, abdominal pain, black or bloody stool, fall since last visit, rash, easy bruising and increased thirst.    Most recent toxicity monitoring by blood work was done on 11/20/2019 and did not reveal any significant adverse effects. Most recent inflammatory markers from 11/20/2019 revealed a ESR 10 mm/hr and CRP 2 mg/L. The patient has not had any interval hospital admissions, infections, or surgeries. REVIEW OF SYSTEMS    A comprehensive review of systems was performed and pertinent results are documented in the HPI, review of systems is otherwise non-contributory. PAST MEDICAL HISTORY    He has a past medical history of Ankylosing spondylitis ([address] and Uveitis. FAMILY HISTORY    His family history includes Heart Disease in his father; Hypertension in his father; No Known Problems in his brother, mother, sister, and sister. SOCIAL HISTORY    He reports that he has never smoked. He has never used smokeless tobacco. He reports current alcohol use. He reports that he does not use drugs. MEDICATIONS    Current Outpatient Medications   Medication Sig    Humira,CF, Pen 40 mg/0.4 mL injection pen INJECT 1 PEN (40 MG) UNDER THE SKIN EVERY 14 DAYS     No current facility-administered medications for this visit. ALLERGIES    No Known Allergies    PHYSICAL EXAMINATION    There were no vitals taken for this visit. There is no height or weight on file to calculate BMI. General: Patient is alert, oriented x 3, not in acute distress    HEENT:   Sclerae are not injected and appear moist.  There is no alopecia.       Chest:  Breathing comfortably at room air    Skin:    Exam deferred  Previous Exam    Psoriasis:     no  Nail Pitting:     no  Onycholysis:     no  Splinter Hemorrhage:   no  Palmoplantar pustulosis:   no  Acne fulminans:    no  Acne conglobata:    no  Hidradenitis Suppurativa:   no  Dissecting cellulitis of the scalp:  no  Pilonidal sinus:    no  Erythema nodosum:    no    Musculoskeletal:  A comprehensive musculoskeletal exam was NOT performed for all joints of each upper and lower extremity and assessed for swelling, tenderness and range of motion.       Exam deferred  Previous Exam    Mild thoracic kyphosis  Midlumbar spine tenderness to palpation  No lumbar or SI joint tenderness     SCOTT: negative  Straight Leg Raise: negative     Costochondritis:  no   Synovitis:   no  Dactylitis:   no  Enthesitis:   No    ANTHROPOMETRICS   (11/20/2019)    Occiput to Wall:                                   2.5 cm (reference 0 cm)  Tragus to Wall (Right):                        17 cm (reference <15 cm)  Tragus to Wall (Left):                          17 cm (reference <15 cm)  Lateral Lumbar Flexion (Right):          19 cm (reference >10 cm)  Lateral Lumbar Flexion (Left):                        14 cm (reference >10 cm)  Cervical Rotation (Right):                   80 degrees (reference >70 degrees)  Cervical Rotation (Left):                      80 degrees (reference >70 degrees)  Chest Expansion:                                10 cm (reference >2 cm)  Intermalleolar distance:                      >120 cm (reference >100 cm)  Modified Schober:                               7 cm  (reference >=4 cm)      ANTHROPOMETRICS  (2/20/2020)    Occiput to Wall:   12 cm (reference 0 cm)  Tragus to Wall (Right):  12 cm (reference <15 cm)  Tragus to Wall (Left):   0 cm (reference <15 cm)  Lateral Lumbar Flexion (Right): 23 cm (reference >10 cm)  Lateral Lumbar Flexion (Left):  23 cm (reference >10 cm)  Cervical Rotation (Right):  90 degrees (reference >70 degrees)  Cervical Rotation (Left):  90 degrees (reference >70 degrees)  Chest Expansion:   10 cm (reference >2 cm)  Intermalleolar distance:  120 cm (reference >100 cm)  Modified Schober:   7 cm  (reference >=4 cm)      DATA REVIEW    Laboratory     Recent laboratory results were reviewed, summarized, and discussed with the patient. Imaging    Musculoskeletal Ultrasound    None    Radiographs    Thoracic Spine 12/02/2019: There is no acute fracture or subluxation. Vertebral body heights and intervertebral disc spaces are maintained. There are no osteophytes or syndesmophytes. There is no abnormality in alignment. Lumbar Spine 12/02/2019: There is no acute fracture or subluxation. Vertebral body heights and intervertebral disc spaces are maintained. There are no osteophytes or syndesmophytes. There is no abnormality in alignment. Sacroiliac Joint 12/02/2019: no abnormality.      CT Imaging    None    MR Imaging    None    DXA     None    ASSESSMENT AND PLAN    This is a follow-up visit for Mr. Tamiko Grider. 1) Ankylosing Spondylitis. (thoracic and lumbar, left uveitis) He is maintained on Humira 40 mg every 14 days with good tolerance and no breakthrough. He denies inflammantory back pain or any interval flares of uveitis. I will continue treatment. Annual labs will be mailed. 2) Left Uveitis. This resolved. 3) Long Term Use of Immunosuppressants. The patient remains on high risk immunomodulatory medications (Humira) and requires frequent toxicity monitoring by blood work to evaluate for toxicities. Respective labs were ordered (see below orders for details). 4) Mechanical Back Pain. This was overuse and is no longer an active issue. The patient voiced understanding of the aforementioned assessment and plan.      The patient has consented for synchronous (real-time) Telemedicine (audio-video technology) on 5/5/2021 for their care to be delivered over telemedicine in place of their regularly scheduled office visit pursuant to the emergency declaration under the [facility], [facility] waiver authority and the [employer] Act, this Virtual  Visit was conducted, with patient's consent, to reduce the patient's risk of exposure to COVID-19 and provide continuity of care for an established patient. A total of 38 minutes was spent on this visit, reviewing interval notes, interval testing results, ordering tests, refilling medications, documenting the findings in the note, patient education, counseling, and coordination of care as described above. All questions asked and answered. Services were provided through a video synchronous discussion virtually to substitute for in-person clinic visit.     TODAY'S ORDERS    Orders Placed This Encounter    QUANTIFERON-TB GOLD PLUS    CHRONIC HEPATITIS PANEL    ADALIMUMAB+AB     Future Appointments   Date Time Provider Andre Donnelly   10/8/2021 10:00 AM Kathleen Madrigal MD AOCR BS AMB     Mag Iyer MD, [address] [facility]    Adult Rheumatology   Rheumatology Ultrasound Certified  [address]  [facility], [address] [place] [address]   Phone [phone]  Fax [phone]

## 2021-07-20 LAB
ADALIMUMAB DRUG LEVEL, 503866: 11 UG/ML
ANTI-ADALIMUMAB ANTIBODY, 503867: <25 NG/ML
COMMENT, 144067: NORMAL
GAMMA INTERFERON BACKGROUND BLD IA-ACNC: 0.04 IU/ML
HBV CORE AB SERPL QL IA: NEGATIVE
HBV CORE IGM SERPL QL IA: NEGATIVE
HBV E AB SERPL QL IA: NEGATIVE
HBV E AG SERPL QL IA: NEGATIVE
HBV SURFACE AB SER QL: REACTIVE
HBV SURFACE AG SERPL QL IA: NEGATIVE
HCV AB S/CO SERPL IA: 0.1 S/CO RATIO (ref 0–0.9)
M TB IFN-G BLD-IMP: NEGATIVE
M TB IFN-G CD4+ BCKGRND COR BLD-ACNC: 0.02 IU/ML
MITOGEN IGNF BLD-ACNC: >10 IU/ML
QUANTIFERON INCUBATION, QF1T: NORMAL
QUANTIFERON TB2 AG: 0 IU/ML
SERVICE CMNT-IMP: NORMAL

## 2021-08-19 DIAGNOSIS — M45.5 ANKYLOSING SPONDYLITIS OF THORACOLUMBAR REGION (HCC): ICD-10-CM

## 2021-08-19 DIAGNOSIS — H30.92 LEFT POSTERIOR UVEITIS: ICD-10-CM

## 2021-08-19 RX ORDER — ADALIMUMAB 40MG/0.4ML
KIT SUBCUTANEOUS
Qty: 1 KIT | Refills: 11 | Status: SHIPPED | OUTPATIENT
Start: 2021-08-19 | End: 2022-08-12 | Stop reason: SDUPTHER

## 2021-10-08 ENCOUNTER — VIRTUAL VISIT (OUTPATIENT)
Dept: RHEUMATOLOGY | Age: 31
End: 2021-10-08
Payer: COMMERCIAL

## 2021-10-08 DIAGNOSIS — M45.5 ANKYLOSING SPONDYLITIS OF THORACOLUMBAR REGION (HCC): Primary | ICD-10-CM

## 2021-10-08 DIAGNOSIS — Z79.60 LONG-TERM USE OF IMMUNOSUPPRESSANT MEDICATION: ICD-10-CM

## 2021-10-08 DIAGNOSIS — H30.92 LEFT POSTERIOR UVEITIS: ICD-10-CM

## 2021-10-08 PROCEDURE — 99214 OFFICE O/P EST MOD 30 MIN: CPT | Performed by: INTERNAL MEDICINE

## 2021-10-08 RX ORDER — ESCITALOPRAM OXALATE 10 MG/1
10 TABLET ORAL DAILY
COMMUNITY

## 2021-10-08 NOTE — PROGRESS NOTES
REASON FOR VISIT    This is a follow-up visit for Mr. Rito Cotto for     ICD-10-CM   1. Ankylosing spondylitis of thoracolumbar region ([facility] [address] M45.5   2. Left posterior uveitis H30.92     The patient has consented for synchronous (real-time) Telemedicine (audio-video technology) on 10/8/2021 for their care to be delivered over telemedicine in place of their regularly scheduled office visit pursuant to the emergency declaration under the [address] [place] [address] waiver authority and the [employer] Act, this Virtual  Visit was conducted, with patient's consent, to reduce the patient's risk of exposure to COVID-19 and provide continuity of care for an established patient. Services were provided through a video synchronous discussion virtually to substitute for in-person clinic visit. Spondyloarthritis phenotype includes:  Anti-CCP positive: N/A  Rheumatoid factor positive: N/A  HLA-B27 positive: N/A  Inflammatory Back Pain: yes  Erosive disease: no  Sacroiliitis: no  Ankylosis: no  Psoriasis: no  Enthesitis: no  Dactylitis: no  Nail Pitting: no  Onycholysis: no  Splinter Hemorrhage: no  Extra-articular manifestations include: uveitis  SAPHO: no    Immunosuppression Screening (7/09/2021): Quantiferon TB: negative  PPD:  Not performed  Hepatitis B: negative  Hepatitis C: negative    Therapy History includes:  Current NSAIDs include: none  Current DMARD therapy include: Humira 40 mg every other Tuesday (12/10/2019 to present)  Prior DMARD therapy include: None  Discontinued DMARDs because of inefficacy: None  Discontinued DMARDs because of side effects: None    HISTORY OF PRESENT ILLNESS    Mr. Rito Cotto returns for a follow-up visit. On his last visit, I continued Humira 40 mg every other week, which he has taken with good tolerance. He denies breakthrough. I ordered annual labs. Today, he feels well.  He denies morning or nocturnal low back pain or stiffness       He denies interval uveitis flares. He started Lexapro 10 mg daily. He is a , free jessica. Most recent toxicity monitoring by blood work was done on 7/09/2021 and did not reveal any significant adverse effects. The patient has not had any interval hospital admissions, infections, or surgeries. REVIEW OF SYSTEMS    A comprehensive review of systems was performed and pertinent results are documented in the HPI, review of systems is otherwise non-contributory. PAST MEDICAL HISTORY    He has a past medical history of Ankylosing spondylitis ([address] and Uveitis. FAMILY HISTORY    His family history includes Heart Disease in his father; Hypertension in his father; No Known Problems in his brother, mother, sister, and sister. SOCIAL HISTORY    He reports that he has never smoked. He has never used smokeless tobacco. He reports current alcohol use. He reports that he does not use drugs. MEDICATIONS    Current Outpatient Medications   Medication Sig    escitalopram oxalate (Lexapro) 10 mg tablet Take 10 mg by mouth daily.  HENRIQUE Lopez, Pen 40 mg/0.4 mL injection pen INJECT 1 PEN (40 MG) UNDER THE SKIN EVERY 14 DAYS. No current facility-administered medications for this visit. ALLERGIES    No Known Allergies    PHYSICAL EXAMINATION    There were no vitals taken for this visit. There is no height or weight on file to calculate BMI. General: Patient is alert, oriented x 3, not in acute distress    HEENT:   Sclerae are not injected and appear moist.  There is no alopecia.       Chest:  Breathing comfortably at room air    Skin:    Exam deferred  Previous Exam    Psoriasis:     no  Nail Pitting:     no  Onycholysis:     no  Splinter Hemorrhage:   no  Palmoplantar pustulosis:   no  Acne fulminans:    no  Acne conglobata:    no  Hidradenitis Suppurativa:   no  Dissecting cellulitis of the scalp:  no  Pilonidal sinus:    no  Erythema nodosum: no    Musculoskeletal:  A comprehensive musculoskeletal exam was NOT performed for all joints of each upper and lower extremity and assessed for swelling, tenderness and range of motion.       Exam deferred  Previous Exam    Mild thoracic kyphosis  Midlumbar spine tenderness to palpation  No lumbar or SI joint tenderness     SCOTT: negative  Straight Leg Raise: negative     Costochondritis:  no   Synovitis:   no  Dactylitis:   no  Enthesitis:   No    ANTHROPOMETRICS   (11/20/2019)    Occiput to Wall:                                   2.5 cm (reference 0 cm)  Tragus to Wall (Right):                        17 cm (reference <15 cm)  Tragus to Wall (Left):                          17 cm (reference <15 cm)  Lateral Lumbar Flexion (Right):          19 cm (reference >10 cm)  Lateral Lumbar Flexion (Left):                        14 cm (reference >10 cm)  Cervical Rotation (Right):                   80 degrees (reference >70 degrees)  Cervical Rotation (Left):                      80 degrees (reference >70 degrees)  Chest Expansion:                                10 cm (reference >2 cm)  Intermalleolar distance:                      >120 cm (reference >100 cm)  Modified Schober:                               7 cm  (reference >=4 cm)      ANTHROPOMETRICS  (2/20/2020)    Occiput to Wall:   12 cm (reference 0 cm)  Tragus to Wall (Right):  12 cm (reference <15 cm)  Tragus to Wall (Left):   0 cm (reference <15 cm)  Lateral Lumbar Flexion (Right): 23 cm (reference >10 cm)  Lateral Lumbar Flexion (Left):  23 cm (reference >10 cm)  Cervical Rotation (Right):  90 degrees (reference >70 degrees)  Cervical Rotation (Left):  90 degrees (reference >70 degrees)  Chest Expansion:   10 cm (reference >2 cm)  Intermalleolar distance:  120 cm (reference >100 cm)  Modified Schober:   7 cm  (reference >=4 cm)    DATA REVIEW    Laboratory     Recent laboratory results were reviewed, summarized, and discussed with the patient. Imaging    Musculoskeletal Ultrasound    None    Radiographs    Thoracic Spine 12/02/2019: There is no acute fracture or subluxation. Vertebral body heights and intervertebral disc spaces are maintained. There are no osteophytes or syndesmophytes. There is no abnormality in alignment. Lumbar Spine 12/02/2019: There is no acute fracture or subluxation. Vertebral body heights and intervertebral disc spaces are maintained. There are no osteophytes or syndesmophytes. There is no abnormality in alignment. Sacroiliac Joint 12/02/2019: no abnormality.      CT Imaging    None    MR Imaging    None    DXA     None    ASSESSMENT AND PLAN    This is a follow-up visit for Mr. Arnaldo Skinner. 1) Ankylosing Spondylitis. (thoracic and lumbar, left uveitis) He is maintained on Humira 40 mg every 14 days with good tolerance and denies breakthrough. He denies inflammantory back pain or any interval flares of uveitis. I will continue treatment. 2) Left Uveitis. This resolved. 3) Long Term Use of Immunosuppressants. The patient remains on high risk immunomodulatory medications (Humira) and requires frequent toxicity monitoring by blood work to evaluate for toxicities. 4) Mechanical Back Pain. This was not an active issue today. The patient voiced understanding of the aforementioned assessment and plan. The patient has consented for synchronous (real-time) Telemedicine (audio-video technology) on 10/8/2021 for their care to be delivered over telemedicine in place of their regularly scheduled office visit pursuant to the emergency declaration under the [facility], On license of [facility] waiver authority and the [employer] Act, this Virtual  Visit was conducted, with patient's consent, to reduce the patient's risk of exposure to COVID-19 and provide continuity of care for an established patient.      A total of 31  minutes was spent on this visit, reviewing interval notes, interval testing results, ordering tests, refilling medications, documenting the findings in the note, patient education, counseling, and coordination of care as described above. All questions asked and answered. Services were provided through a video synchronous discussion virtually to substitute for in-person clinic visit.     TODAY'S ORDERS    None    Future Appointments   Date Time Provider Andre Cony   7/1/2022 10:00 AM Araceli Madrigal MD AOCR BS AMB     Adelaide Rodgers MD, [address] [place] [address]    Adult Rheumatology   Rheumatology Ultrasound Certified  [address] [facility] [address]  [facility], [address] [place] [address]   Phone [phone]  Fax [phone]

## 2022-03-18 PROBLEM — M45.5 ANKYLOSING SPONDYLITIS OF THORACOLUMBAR REGION (HCC): Status: ACTIVE | Noted: 2019-11-20

## 2022-03-19 PROBLEM — H30.92: Status: ACTIVE | Noted: 2019-11-20

## 2022-03-19 PROBLEM — Z79.60 LONG-TERM USE OF IMMUNOSUPPRESSANT MEDICATION: Status: ACTIVE | Noted: 2020-02-20

## 2022-08-02 ENCOUNTER — TELEPHONE (OUTPATIENT)
Dept: PHARMACY | Age: 32
End: 2022-08-02

## 2022-08-02 NOTE — TELEPHONE ENCOUNTER
Medication Management Service    Date: 8/2/2022  Patient's Name: Nata Gonzalez YOB: 1990            _____________________________________________________________________________________________          Left message to schedule initial Specialty Medication PharmD review. Please return call: [phone] option#4.     Kyalh BishopD [facility]  Ambulatory Clinical Pharmacist  Specialty Medication Services  Phone: [phone]

## 2022-08-08 ENCOUNTER — TELEPHONE (OUTPATIENT)
Dept: PHARMACY | Age: 32
End: 2022-08-08

## 2022-08-08 NOTE — TELEPHONE ENCOUNTER
Medication Management Service    Date: 8/8/2022  Patient's Name: Ginger Hernandez YOB: 1990            _____________________________________________________________________________________________          Left message to schedule initial Specialty Medication PharmD review. Please return call: [phone] option#4.     Sen Bush, PharmD [address] [facility]  Ambulatory Clinical Pharmacist  Specialty Medication Services  Phone: [phone]

## 2022-08-10 ENCOUNTER — TELEPHONE (OUTPATIENT)
Dept: PHARMACY | Age: 32
End: 2022-08-10

## 2022-08-10 NOTE — TELEPHONE ENCOUNTER
Medication Management Service    Date: 8/10/2022  Patient's Name: Nata Gonzalez YOB: 1990            _____________________________________________________________________________________________    Shasha Hurst to patient to schedule initial Specialty Medication Service PharmD review on 08/12/2022.     Sherice De Jesus PharmD [facility]  Ambulatory Clinical Pharmacist  Specialty Medication Services  Phone: [phone]

## 2022-08-11 ENCOUNTER — TELEPHONE (OUTPATIENT)
Dept: RHEUMATOLOGY | Age: 32
End: 2022-08-11

## 2022-08-11 NOTE — TELEPHONE ENCOUNTER
Called patient per doc request Kana Hall, could you reach out to Harshad Hart ([birth year]) to see if he can come in on short notice tomorrow? I just got the heads up that we're trying to extend his Humira, but for insurance purposes we'll most likely need to have seen him in-office. From Dr. Maia Maciel note he was a free-jessica , so may have some extra flexibility. Left voice message for patient to call back into the office to see if he is able to come in on 8/12/2022.  sdh

## 2022-08-12 ENCOUNTER — VIRTUAL VISIT (OUTPATIENT)
Dept: PHARMACY | Age: 32
End: 2022-08-12

## 2022-08-12 ENCOUNTER — OFFICE VISIT (OUTPATIENT)
Dept: RHEUMATOLOGY | Age: 32
End: 2022-08-12
Payer: COMMERCIAL

## 2022-08-12 VITALS
RESPIRATION RATE: 18 BRPM | BODY MASS INDEX: 24.22 KG/M2 | HEIGHT: 71 IN | HEART RATE: 84 BPM | WEIGHT: 173 LBS | TEMPERATURE: 98.2 F | SYSTOLIC BLOOD PRESSURE: 100 MMHG | DIASTOLIC BLOOD PRESSURE: 59 MMHG

## 2022-08-12 DIAGNOSIS — Z79.899 ONGOING USE OF POSSIBLY TOXIC MEDICATION: ICD-10-CM

## 2022-08-12 DIAGNOSIS — H30.92 LEFT POSTERIOR UVEITIS: ICD-10-CM

## 2022-08-12 DIAGNOSIS — M45.5 ANKYLOSING SPONDYLITIS OF THORACOLUMBAR REGION (HCC): Primary | ICD-10-CM

## 2022-08-12 PROCEDURE — 99215 OFFICE O/P EST HI 40 MIN: CPT | Performed by: INTERNAL MEDICINE

## 2022-08-12 RX ORDER — ADALIMUMAB 40MG/0.4ML
KIT SUBCUTANEOUS
Qty: 1.2 KIT | Refills: 1 | Status: SHIPPED | OUTPATIENT
Start: 2022-08-12 | End: 2022-08-22 | Stop reason: SDUPTHER

## 2022-08-12 NOTE — PROGRESS NOTES
REASON FOR VISIT    This is a follow-up visit for Mr. Guevara for     ICD-10-CM   1. Ankylosing spondylitis of thoracolumbar region ([facility] M45.5   2. Left posterior uveitis H30.92     Spondyloarthritis phenotype includes:  Anti-CCP positive: N/A  Rheumatoid factor positive: N/A  HLA-B27 positive: N/A  Inflammatory Back Pain: yes  Erosive disease: no  Sacroiliitis: no  Ankylosis: no  Psoriasis: no  Enthesitis: no  Dactylitis: no  Nail Pitting: no  Onycholysis: no  Splinter Hemorrhage: no  Extra-articular manifestations include: uveitis  SAPHO: no    Immunosuppression Screening (7/09/2021): Quantiferon TB: negative  PPD:  Not performed  Hepatitis B: negative  Hepatitis C: negative    Therapy History includes:  Current NSAIDs include: none  Current DMARD therapy include: Humira 40 mg every other Tuesday (12/10/2019 to present)  Prior DMARD therapy include: None  Discontinued DMARDs because of inefficacy: None  Discontinued DMARDs because of side effects: None    HISTORY OF PRESENT ILLNESS    Mr. Guevara returns for a follow-up visit. Pt takes 40mg Humira every other week. He notes that sometimes there is irritation for about a day after the injection that goes away quickly. He says that it has has been a \"game changer. \"    Pt was first diagnosed after he had blood work done in 2016 in the course of working up an eye infection that would not go away. He has had no uveitis since. He established with Dr. Erick Ford in 2019, shortly after which he started Humira (adalimumab). He recently scheduled another appointment with his ophthalmologist.     Pt reports that he does have a history of low back pain and stiffness. He says that his back feels good currently. He notes that his back is more achy when he sits for too long, but it feels better with activity. He uses Ibuprofen when he has pain which takes the pain away. Pt denies mouth ulcers.      REVIEW OF SYSTEMS    A comprehensive review of systems was performed and pertinent results are documented in the HPI, review of systems is otherwise non-contributory. PAST MEDICAL HISTORY    He has a past medical history of Ankylosing spondylitis ([address] and Uveitis. FAMILY HISTORY    His family history includes Heart Disease in his father; Hypertension in his father; No Known Problems in his brother, mother, sister, and sister. No known fhx of autoimmune disease. SOCIAL HISTORY    He reports that he has never smoked. He has never used smokeless tobacco. He reports current alcohol use. He reports that he does not use drugs. . Pt had a baby during [place], and has another one on the way. MEDICATIONS    Current Outpatient Medications   Medication Sig    escitalopram oxalate (Lexapro) 10 mg tablet Take 10 mg by mouth daily. Humira,CF, Pen 40 mg/0.4 mL injection pen INJECT 1 PEN (40 MG) UNDER THE SKIN EVERY 14 DAYS. No current facility-administered medications for this visit. ALLERGIES    No Known Allergies    PHYSICAL EXAMINATION    Visit Vitals  BP (!) 100/59   Pulse 84   Temp 98.2 °F (36.8 °C)   Resp 18   Ht 5' 11\" (1.803 m)   Wt 173 lb (78.5 kg)   BMI 24.13 kg/m²     Body mass index is 24.13 kg/m². General:  The patient is well developed, well nourished, alert, and in no apparent distress. Eyes: Sclera are anicteric. No conjunctival injection. HEENT:  Oropharynx is clear. No oral ulcers. Adequate salivary pooling. No cervical or supraclavicular lymphadenopathy. Lungs:  Clear to auscultation bilaterally, without wheeze or stridor. Normal respiratory effort. Cor:  Regular rate and rhythm. No murmur rub or gallop. Abdomen: Soft, non-tender, without hepatomegaly or masses. Extremities: No calf tenderness or edema. Warm and well perfused. Skin:  No significant abnormalities. No petechial, purpuric, or psoriaform rashes   Neuro: Nonfocal, no foot or wrist drop. Normal unassisted gait.   Musculoskeletal:    A comprehensive musculoskeletal exam was performed for all joints of each upper and lower extremity and assessed for swelling, tenderness and range of motion. Results are documented as below:  Occiput-wall test: 0cm  mSchober 15->20.5cm  Negative LIV. No direct SI tenderness. No evidence of synovitis in the small joints of the hands, wrists, shoulders, elbows, hips, knees or ankles. DATA REVIEW    Laboratory     Recent laboratory results were reviewed, summarized, and discussed with the patient. 7/9/21: Hep B sAg neg, Hep B cAb IgM and total Neg, Hep B eAb neg, Hep B Sab reactive; Hep C Ab neg; adalimumab level 11 (high), anti-drug antibody undetectable, QFN gold negative    Imaging    Musculoskeletal Ultrasound    None    Radiographs    Thoracic Spine 12/02/2019: There is no acute fracture or subluxation. Vertebral body heights and intervertebral disc spaces are maintained. There are no osteophytes or syndesmophytes. There is no abnormality in alignment. Lumbar Spine 12/02/2019: There is no acute fracture or subluxation. Vertebral body heights and intervertebral disc spaces are maintained. There are no osteophytes or syndesmophytes. There is no abnormality in alignment. Sacroiliac Joint 12/02/2019: no abnormality. CT Imaging    None    MR Imaging    None    DXA     None    ASSESSMENT AND PLAN    This is a follow-up visit for  Alexandre Radha for ankylosing spondylitis and uveitis in clinical remission on Humira (adalimumab). 1. Ankylosing spondylitis of thoracolumbar region ([facility]  - adalimumab (Humira,CF, Pen) 40 mg/0.4 mL injection pen; INJECT 1 PEN (40 MG) UNDER THE SKIN EVERY 14 DAYS. Dispense: 1.2 Kit; Refill: 1  - AMB REFERRAL TO SPECIALTY MEDICATION SERVICE  - C REACTIVE PROTEIN, QT; Future  - CBC WITH AUTOMATED DIFF; Future  - METABOLIC PANEL, COMPREHENSIVE; Future  - SED RATE (ESR); Future  - XR SPINE LUMB MIN 4 V; Future  - XR HIPS BI W PELV 3 OR 4 VWS; Future    2.  Left posterior uveitis  - adalimumab (Humira,CF, Pen) 40 mg/0.4 mL injection pen; INJECT 1 PEN (40 MG) UNDER THE SKIN EVERY 14 DAYS. Dispense: 1.2 Kit; Refill: 1    3. Ongoing use of possibly toxic medication  - CBC WITH AUTOMATED DIFF; Future  - METABOLIC PANEL, COMPREHENSIVE; Future    Patient Instructions   1) Continue to take your Humira injections once every other week. 2) I recommend you get another Covid booster if you are eligible. 3) I am ordering spine and pelvis Xrays. You can wait to get these because I do not know if [facility] is going to charge you extra. If you find out it is affordable then go ahead and get them done. 4) You can use 400-600mg of Ibuprofen for joint pain as needed. Try not to take this more than 3 times in a week. 5) Check labs ASAP. 6) Follow up in 4 months. Let me know if you have any questions or concerns in the meantime. TODAY'S ORDERS    Orders Placed This Encounter    XR SPINE LUMB MIN 4 V     Standing Status:   Future     Standing Expiration Date:   9/12/2023    XR HIPS BI W PELV 3 OR 4 VWS     Standing Status:   Future     Standing Expiration Date:   9/12/2022     Order Specific Question:   Reason for Exam     Answer:   hip pain, r/o inflammatory arthritis versus degenerative arthritis versus AVN    C REACTIVE PROTEIN, QT     Standing Status:   Future     Number of Occurrences:   1     Standing Expiration Date:   8/7/2023    CBC WITH AUTOMATED DIFF     Standing Status:   Future     Number of Occurrences:   1     Standing Expiration Date:   3/8/3877    METABOLIC PANEL, COMPREHENSIVE     Standing Status:   Future     Number of Occurrences:   1     Standing Expiration Date:   8/7/2023    SED RATE (ESR)     Standing Status:   Future     Number of Occurrences:   1     Standing Expiration Date:   8/7/2023    Referral to Specialty Medication Services     Referral Priority:   Routine     Referral Type:    Other     Referral Reason:   Specialty Services Required     Requested Specialty:   Pharmacist Number of Visits Requested:   1    adalimumab (Humira,CF, Pen) 40 mg/0.4 mL injection pen     Sig: INJECT 1 PEN (40 MG) UNDER THE SKIN EVERY 14 DAYS.      Dispense:  1.2 Kit     Refill:  1         Future Appointments   Date Time Provider Andre Donnelly   12/14/2022 11:00 AM Mariam Guidry MD [facility] BS AMB     Face to face time: 25 minutes  Note preparation and records review day of service: 25 minutes  Total provider time day of service: 50 minutes     This was scribed by Kiara Oliver in the presence of Dr. Maria Luisa Petersen MD    Adult Rheumatology   [address] [facility] [address]  [facility], [address] [facility], [address] [facility]   Phone [phone]  Fax [phone]

## 2022-08-12 NOTE — PATIENT INSTRUCTIONS
1) Continue to take your Humira injections once every other week. 2) I recommend you get another Covid booster if you are eligible. 3) I am ordering spine and pelvis Xrays. You can wait to get these because I do not know if [facility] is going to charge you extra. If you find out it is affordable then go ahead and get them done. 4) You can use 400-600mg of Ibuprofen for joint pain as needed. Try not to take this more than 3 times in a week. 5) Check labs ASAP. 6) Follow up in 4 months. Let me know if you have any questions or concerns in the meantime.

## 2022-08-12 NOTE — PROGRESS NOTES
Specialty Medication Service    Patient's Name: Federico Olson YOB: 1990      Reason for visit: Federico Olson is a 33 y.o. male presenting today for Specialty Medication Service visit. Patient is prescribed [facility] formulary medication, Humira. Medication list updated. Specialty Medication: Humira 40mg/0.4ml   Frequency: every 14 days  Indication: Ankylosing spondylitis of thoracolumbar region  Initially Diagnosed: 2019  Additional Therapy:   None  Previous Therapy:   Ibuprofen     Specialist:   Kirk Zambrano MD (previously Shashi Ochoa)  [address] [facility] Rheumatology  [facility], [address]  Phone: [phone]    Specialist Progress Note Available: Yes, Epic  Last Specialist Visit: 10/08/2021: Follow-up visit for ankylosing spondylitis of thoracolumbar region and left posterior uveitis. At last visit Humira 40mg was continued every other week, which he has taken with good tolerance. He denies breakthrough. Today he feels well, denies morning or nocturnal low back pain or stiffness. Denies interval uveitis flares. He started lexapro 10mg daily. Most recent blood work on 07/09/2021 did not reveal any significant toxicities. A/P: He is maintained on Humira 40mg every 14 days with good tolerance. Denies any inflammatory back pain. Continue current treatment. Left eye uveitis still resolved since 2012.      No Known Allergies     Past Medical History:   Diagnosis Date    Ankylosing spondylitis ([facility]     Uveitis       Social History     Tobacco Use    Smoking status: Never    Smokeless tobacco: Never   Substance Use Topics    Alcohol use: Yes     Family History   Problem Relation Age of Onset    No Known Problems Mother     Heart Disease Father     Hypertension Father     No Known Problems Sister     No Known Problems Brother     No Known Problems Sister        REVIEW OF CURRENT DISEASE STATE  Ankylosing Spondylitis: Patient with diagnosis of ankylosing spondylitis here for evaluation related to specialty medication use. Patient reports in 2012, he developed left eye inflammation and photosensitivity. Ophthalmologist diagnosed him with uveitis and was treated with topical steroids. He has not had a recurrence of uveitis since then. At the same time he was also told he might have ankylosing spondylitis based on blood work. In 2013, he developed lower back aching pain and stiffness worse in morning and evening lasting 3-4 hours. The pain would usually wake him up at 5am and he thought it was issues with his mattress (changing mattress did not help). There was also tightness in middle back lumbar spine as well. Ibuprofen would help as well as activity and exercise (rest worsened). Patient saw rheumatologist in 2019 for evaluation. The ibupfrofen was helping some, but continued pain at night caused poor sleep. Patient was started on Humira 40mg every 14 days. Shortly after starting, patient reported feeling great, no longer waking up at night due to pain, no longer taking ibuprofen and no breakthrough symptoms. He also had improvement from baseline anthropometrics. Patient states he has remained stable since staring Humira. Current symptoms include none (denies any back pain or eye pain). Patient might have slight lower back pain (2/10) from overuse, but reports Humira has changed his life. Symptoms are made worse by: overuse. Inactivity. Symptoms are helped by movement. Only exercising once monthly. Associated symptoms include: depression, however patient states he started  lexapro 10/2021 and that has helped his mood a lot. Patient also alerted me prior to today's visit that he is due for an office visit and new labs, but his previous rheumatologist has left the practice. I was able to work with Dr. Jacqueline Pierce and get a last minute appointment scheduled today with him.          RAPID3 - Routine Assessment of Patient Index Data   Over the last week,  were you able to:   0: Without ANY difficulty  1: With SOME difficulty  2: With [facility] difficulty  3: UNABLE to do    Score   1. Dress yourself, including tying shoelaces and doing buttons? 1    Get in and out of bed? 1    Lift a full cup or glass to your mouth? 1    Walk outdoors on flat ground? 1    Wash and dry your entire body? 1    Bend down to  clothing from the floor? 1    Turn regular faucets on and off? 1    Get in and out of a car, bus, train, or airplane? 1    Walk two miles or three kilometers, if you wish? 1    Participate in recreational activities and sports as you would like, if you wish? 1    Get a good night's sleep? 2    Deal with feelings of anxiety or being nervous? 2    Deal with feelings of depression or feeling blue? 2   2. How much pain have you had because of your condition over the past week? (Scale 0 - 10) 0   3. Considering all the ways in which illness and health conditions may affect you at this time, how are you doing? (Scale 0 - 10) 2   Final Score: 2 ; remission activity          · Considering all the ways in which this condition and others affects you, how are you doing (0 = very well, 10 = very poorly)? 2  · How would you rate your pain on average? (0 = no pain, 10 = worst pain imaginable) 0  · During the past 4 weeks, have you missed any planned activities of daily living due to condition (work/school/other plans)? No  · During the past 4 weeks, have you had to seek urgent care, ER admission, Unplanned doctor office visit, or hospital admission? No    MEDICATIONS  Current Outpatient Medications   Medication Sig Dispense Refill    escitalopram oxalate (LEXAPRO) 10 mg tablet Take 10 mg by mouth daily.       Humira,CF, Pen 40 mg/0.4 mL injection pen INJECT 1 PEN (40 MG) UNDER THE SKIN EVERY 14 DAYS. 1 Kit 11       Current Specialty Medication:   Adalimumab (Humira)   Ankylosing Spondylitis: 40 mg every other week  Warnings to monitor for: Anaphylaxis/hypersensitivity reactions, Positive antinuclear antibody titers (even with negative baseline), Demyelinating CNS disease (new-onset or exacerbation), Heart failure (Worsening or new-onset), pancytopenia and aplastic anemia, reactivation of hepatitis B (HBV), Infections: [US Boxed Warning], Malignancy: [US Boxed Warning], Tuberculosis: [US Boxed Warning]  Recommended monitoring: Monitor improvement of symptoms and physical function assessments, signs/symptoms/worsening of heart failure; TB screening (every 6-12 months dependent upon length of therapy and other risk factors), CBC with differential (every 6 weeks for 3 months then annually); LFTs (Annually); HBV screening (annual); signs/symptoms of malignancy (eg, splenomegaly, hepatomegaly, abdominal pain, persistent fever, night sweats, weight loss). Storage: Refrigerated at 36°F to 46°F (2°C to 8°C). DO NOT FREEZE. Do not use if frozen even if it has been thawed. Handling: May store at room temperature (If needed) for up to 14 days with protection from light. Discard if not used within the 14-day period. (keep track by writing down date removed from refrigerator). Do not use beyond the expiration date on the container. Patient Reported Side Effects: itching at injection site, gone after a day. Abdominal pain: denies  Body aches, fevers, chills: denies  Infections: denies  Night sweats: denies  Unexpected weight loss: denies    Specialty Medication Start Date: 12/10/2019  Appropriate Dose: Yes  Last tuberculin Test: 07/09/2021   - Result: Negative  HLA B27 Positive: unknown     Describe your medication adherence over the last 4 weeks: Excellent  How many doses have you missed in the last 4 weeks, if any? 0  How confident are you to follow the injection process and the treatment plan? (0-10) 9 Who injects? Self   Does the patient have a current infection of any kind? No    Contraindications to therapy present?  No    Drug Interactions:  No clinically significant interactions identified via [employer] as category D or higher.  _____________________________________________________________________  Renal Dosing:  Creatinine Clearance: CrCl cannot be calculated (Patient's most recent lab result is older than the maximum 180 days allowed. ). No renal adjustments necessary. LABS    Lab Results   Component Value Date/Time    Hep B surface Ag screen Negative 07/09/2021 11:07 AM    Hepatitis Be Antigen Negative 07/09/2021 11:07 AM    Hep B Core Ab, IgM Negative 07/09/2021 11:07 AM    Hep B Core Ab, total Negative 07/09/2021 11:07 AM    Hepatitis Be Antibody Negative 07/09/2021 11:07 AM    HEP B SURFACE AB, QUAL Reactive 07/09/2021 11:07 AM    HCV Ab 0.1 07/09/2021 11:07 AM     Lab Results   Component Value Date/Time    Sed rate (ESR) 10 11/20/2019 10:09 AM       Lab Results   Component Value Date/Time    BUN 12 11/20/2019 10:09 AM     Lab Results   Component Value Date/Time    WBC 6.4 11/20/2019 10:09 AM    HGB 15.2 11/20/2019 10:09 AM    HCT 44.7 11/20/2019 10:09 AM    RBC 5.05 11/20/2019 10:09 AM    PLATELET 974 79/74/2901 10:09 AM     Lab Results   Component Value Date/Time    ALT (SGPT) 43 11/20/2019 10:09 AM    AST (SGOT) 27 11/20/2019 10:09 AM       IMMUNIZATIONS  Immunization History   Administered Date(s) Administered    COVID-19, PFIZER PURPLE top, DILUTE for use, (age 15 y+), IM, 30mcg/0.3mL 03/01/2021, 04/01/2021, 12/01/2021    Influenza Vaccine (Mdck Quadrivalent)(>2 Yrs Flucelvax Quad vial 53698) 11/22/2019    Tdap 04/04/2020      Immunization status: up to date and documented, missing doses of [id number] (immunosuppression), Shingrix (immunosuppression). Patient also asked if he should get second booster shot. ASSESSMENTS  Fall Risk Assessment, last 12 mths 8/12/2022   Able to walk? Yes   Fall in past 12 months? 0       [id number] Questions Score   In general, would you say your health is: 3   2. In general, would you say your quality of life is: 3   3.    In general, how would you rate your physical health? 3   4. In general, how would you rate your mental health, including mood and your ability to think? 3   5. In general, how would you rate your satisfaction with your social activities and relationships? 3   6. In general, please rate how well you carry out your usual social activities and roles. 1   7. To what extent are you able to carry out your everyday physical activities such as walking, climbing stairs, carrying groceries, or moving a chair? 5   8. In the past 7 days, how often have you been bothered by emotional problems such as feeling anxious, depressed or irritable? 4   9. In the past 7 days, how would you rate your fatigue on average? 5   10. How would you rate your pain on average? 5   Promise Physical Score (Questions: 3, 7, 9, 10) 18   Promise Mental Score (Questions: 2, 4, 5, 8) 13         Ankylosing Spondylitis   Cristian Yen is a 33 y.o. male being treated for Ankylosing Spondylitis. Medication reconciliation completed (patient provided), no drug-drug interactions identified. Allergy and diagnosis info reviewed and updated. Cristian Yen has a history remarkable for the following conditions: ankylosing spondylitis, prior left eye uveitis, depression/anxiety  The patient has previously been treated with ibuprofen with some relief but was still having a lot of back pain that kept him up at night. Current therapy includes: Humira 40 mg every 14 days  Warnings, precautions, and contraindications reviewed with the patient. Also reviewed storage, administration, and proper disposal. Patient keeping refrigerated and taking out 30 minutes prior to injection. Current disease state symptoms include: None, patient denies back pain or eye pain. States occasional lower back pain if he overuses it (pain 2/10). Medication Effectiveness: Patient disease is well controlled on current therapy. Rapid3 score today, 2 (remission). Reviewed potential side effects/ADEs. Itching at injection site, however it resolves on its' own within the day. No other side effects/adverse events reported, and no adherence issues identified. Functional and cognitive limitations include: none, patient can perform all ADL. Reviewed copay and advised patient that they will receive a copy of the patient rights and responsibility document with their welcome packet in the mail. Patient is not considered high risk. Based on patient feedback/results of the assessment, the therapy is  still appropriate. No medication-related problem(s) or patient need(s) identified that would require a care plan. Follow up in 180 days    2. Immunization  Immunization status: up to date and documented, missing doses of Shingrix (immunosuppression), [id number] (immunosuppression). Patient educated on benefits of both vaccines and states wants to pursue at his local pharmacy (except [facility]). Follow-up next SMS visit. Patient also inquired about 2nd Covid booster. He has had two primary pfizer doses, followed by booster 8 months later. Patient could have technically received a third dose of pfizer prior to booster due to immunocompromised state of Humira. Patient educated he is eligible to pursue additional booster at this time based on current guidelines. Patient will consider and talk to his provider as well. Follow-up next SMS visit. Tuberculin test performed in the past 6 months (if new to biologic or immunomodulator therapy) or within 12 months (if not new to therapy but with risk factors for TB)? No, however last test 07/09/2021 - negative. 3. Drug Interaction  None    4. Other Identified Potential Issues  Discussed with patient the Pharmacist Collaborative Practice Agreement. Patient provided verbal and/or electronic (ex. crowdSPRINGt) consent to participate in the collaborative practice agreement between the pharmacist and referred patient.  This is in lieu of paper consent due to COVID-19 precautions and the use of remote/virtual visits. PLAN  Goals of therapy, common side effects, medication storage, and administration reviewed with patient. Continue Humira 40mg every 14 days as prescribed. Recommended monitoring to complete: CBC w/ diff, CMP, TB (patient to discuss with new rheumatologist today)  Recommended vaccinations: Shingrix, Prevnar, Covid booster #2  Keep all scheduled appointments.      Sherice De Jesus, PharmD [facility]  Ambulatory Clinical Pharmacist  Specialty Medication Services  Phone: [phone]    For Pharmacy Admin Tracking Only    CPA in place: Yes  Recommendation Provided To: Provider: 1 via Note to Provider  and Patient/Caregiver: 1 via Telephone  Intervention Detail: Adherence Monitorin, Refill(s) Provided, Scheduled Appointment, and Vaccine Recommended/Administered  Intervention Accepted By: Provider: 1 and Patient/Caregiver: 1  Time Spent (min): 60

## 2022-08-12 NOTE — Clinical Note
Tomasz Perea. Completed pharmacy review for initial SMS visit. Patient is continuing on Humira for Ankylosing spondylitis. Patient recommended to follow-up with rheum and obtain new labs. Patient already scheduled with you today. Patient is recommended for Shingrix, [id number], Covid booster#2 vaccination. Medication pended for review. Patient doing well, follow-up 180 days.    Thank you, Anju Yin, PharmD [facility] Ambulatory Clinical Pharmacist Specialty Medication Services Phone: [phone]

## 2022-08-18 ENCOUNTER — VIRTUAL VISIT (OUTPATIENT)
Dept: PHARMACY | Age: 32
End: 2022-08-18

## 2022-08-18 DIAGNOSIS — H30.92 LEFT POSTERIOR UVEITIS: ICD-10-CM

## 2022-08-18 DIAGNOSIS — M45.5 ANKYLOSING SPONDYLITIS OF THORACOLUMBAR REGION (HCC): Primary | ICD-10-CM

## 2022-08-18 NOTE — PROGRESS NOTES
Initial Specialty Medication Virtual Visit  [facility]  [address]  [place] [address]  Dept: [phone]  Dept Fax: [phone]: [phone]  Date of patient's visit: 8/18/2022  Patient's Name:  Danna David YOB: 1990            Patient Care Team:  None as PCP - General  ================================================================    REASON FOR SPECIALTY PHARMACY VISIT/CHIEF COMPLAINT:  Humira monitoring    HISTORY OF PRESENTING ILLNESS:  Danna David is 33 y.o. is here for initial virtual visit for specialty medication. Specialty Medication: Humira 40mg/0.4mL  Frequency: every 14 days  Indication: ankylosing spondylitis with posterior uveitis  Initially Diagnosed: 2019  Specialist: MD Mario Judge)  Last Specialist Visit: 8/12/22  Side effects includes: None   Current symptoms include: None  Danna David has no new complaints today. Recent blood work is overdue. Last done on 7/9/21 show normal Hepatitis viral screening and negative quantiferon test.        DIAGNOSTIC FINDINGS:  CBC:  Lab Results   Component Value Date/Time    WBC 6.4 11/20/2019 10:09 AM    HGB 15.2 11/20/2019 10:09 AM    PLATELET 082 33/24/7908 10:09 AM       BMP:    Lab Results   Component Value Date/Time    Sodium 140 11/20/2019 10:09 AM    Potassium 4.5 11/20/2019 10:09 AM    Chloride 101 11/20/2019 10:09 AM    CO2 23 11/20/2019 10:09 AM    BUN 12 11/20/2019 10:09 AM       HEMOGLOBIN A1C: No components found for: LABA1C    FASTING LIPID PANEL:No results found for: CHOL, HDL    No results found for: JUDI    No results found for: HAV, HBEAG        Patient Active Problem List   Diagnosis Code    Ankylosing spondylitis of thoracolumbar region [facility]) M45.5    Left posterior uveitis H30.92    Long-term use of immunosuppressant medication Z79.899       There are no preventive care reminders to display for this patient.     No Known Allergies      Current Outpatient Medications   Medication Sig Dispense Refill    adalimumab (Humira,CF, Pen) 40 mg/0.4 mL injection pen INJECT 1 PEN (40 MG) UNDER THE SKIN EVERY 14 DAYS. 1.2 Kit 1    escitalopram oxalate (LEXAPRO) 10 mg tablet Take 10 mg by mouth daily. Social History     Tobacco Use    Smoking status: Never    Smokeless tobacco: Never   Substance Use Topics    Alcohol use: Yes    Drug use: Never       Family History   Problem Relation Age of Onset    No Known Problems Mother     Heart Disease Father     Hypertension Father     No Known Problems Sister     No Known Problems Brother     No Known Problems Sister         PHYSICAL EXAM:  There were no vitals filed for this visit. BP Readings from Last 3 Encounters:   08/12/22 (!) 100/59   02/20/20 109/70   11/20/19 116/79          ASSESSMENT AND PLAN:  This is a Specialty Pharmacy virtual visit for evaluation of Humira administered every 2 weeks for ankylosing spondylitis complicated by posterior uveitis. Inflammatory disease has been in clinical remission since starting Humira. He is overdue for deep organ function monitoring but agreeable to getting this done ASAP. Chandler Misty Will refill Humira (adalimumab) but hold further refills until we can verify his organ function remains normal. Revising ambiguous order placed at our 8/12/22 initial clinic visit. Diagnoses and all orders for this visit:    1. Ankylosing spondylitis of thoracolumbar region ([facility] [address]    FOLLOW UP AND INSTRUCTIONS:  Follow up with pharmD 6 months    Denyse Bosworth received counseling on the following healthy behaviors: exercise, medication adherence, and vaccinations. He has updated his COVID booster and is scheduling [id number] and Shingrix vaccinations. Discussed use, benefit, and side effects of prescribed medications. Barriers to medication compliance addressed. Patient to follow up with specialist regularly. All patient questions answered. Pt voiced understanding.       Arlene Ogden Lyndal Hodgkins, MD MHS  Rheumatology Medical Director,  [employer] Specialty Medication Service      8/18/2022, 7:08 PM

## 2022-08-22 RX ORDER — ADALIMUMAB 40MG/0.4ML
KIT SUBCUTANEOUS
Qty: 0.8 ML | Refills: 1 | Status: SHIPPED | OUTPATIENT
Start: 2022-08-22

## 2022-10-14 NOTE — PROGRESS NOTES
[facility] [address] [facility] Update     Date: 5/27/2020     Jannetta Claude Porter 1990      Medication: Humira CF 40mg/0.4mL pen     Co-pay = $192 (used [employer])     Fill: 5/18/2020     Ship: 5/26/2020     Deliver: 5/27/2020     Next Fill Due: 6/17/2020     Pharmacist offered counseling.     Laurel Goodpasture, [address] at [facility],  Mansi Howell, [address]  phone: [phone]   fax: [phone] Attending Only

## 2022-12-14 ENCOUNTER — OFFICE VISIT (OUTPATIENT)
Dept: RHEUMATOLOGY | Age: 32
End: 2022-12-14

## 2022-12-14 ENCOUNTER — APPOINTMENT (OUTPATIENT)
Dept: FAMILY MEDICINE CLINIC | Age: 32
End: 2022-12-14

## 2022-12-14 VITALS
HEART RATE: 74 BPM | RESPIRATION RATE: 16 BRPM | DIASTOLIC BLOOD PRESSURE: 78 MMHG | OXYGEN SATURATION: 97 % | WEIGHT: 179 LBS | SYSTOLIC BLOOD PRESSURE: 112 MMHG | BODY MASS INDEX: 24.97 KG/M2 | TEMPERATURE: 98.3 F

## 2022-12-14 DIAGNOSIS — H30.92 LEFT POSTERIOR UVEITIS: ICD-10-CM

## 2022-12-14 DIAGNOSIS — Z79.899 ONGOING USE OF POSSIBLY TOXIC MEDICATION: ICD-10-CM

## 2022-12-14 DIAGNOSIS — M45.5 ANKYLOSING SPONDYLITIS OF THORACOLUMBAR REGION (HCC): Primary | ICD-10-CM

## 2022-12-14 PROCEDURE — 99214 OFFICE O/P EST MOD 30 MIN: CPT | Performed by: INTERNAL MEDICINE

## 2022-12-14 NOTE — PROGRESS NOTES
Chief Complaint   Patient presents with    Joint Pain     1. Have you been to the ER, urgent care clinic since your last visit? Hospitalized since your last visit? No    2. Have you seen or consulted any other health care providers outside of the [address] [place] [address] since your last visit? Include any pap smears or colon screening.  No

## 2022-12-14 NOTE — PATIENT INSTRUCTIONS
1) Continue to take every other week Humira injections. 2) Check labs ASAP and again in 6 months. 3) I am ordering Xrays for you to get on your way out today. 4) Follow up in one year. Let me know if you have any questions or concerns int he meantime.

## 2022-12-14 NOTE — PROGRESS NOTES
REASON FOR VISIT    This is a follow-up visit for Mr. Hloly Cowart for     ICD-10-CM   1. Ankylosing spondylitis of thoracolumbar region ([facility] [address] M45.5   2. Left posterior uveitis H30.92     Spondyloarthritis phenotype includes:  Anti-CCP positive: N/A  Rheumatoid factor positive: N/A  HLA-B27 positive: N/A  Inflammatory Back Pain: yes  Erosive disease: no  Sacroiliitis: no  Ankylosis: no  Psoriasis: no  Enthesitis: no  Dactylitis: no  Nail Pitting: no  Onycholysis: no  Splinter Hemorrhage: no  Extra-articular manifestations include: uveitis  SAPHO: no    Immunosuppression Screening (7/09/2021): Quantiferon TB: negative  PPD:  Not performed  Hepatitis B: negative  Hepatitis C: negative    Therapy History includes:  Current NSAIDs include: none  Current DMARD therapy include: Humira 40 mg every other Tuesday (12/10/2019 to present)  Prior DMARD therapy include: None  Discontinued DMARDs because of inefficacy: None  Discontinued DMARDs because of side effects: None    HISTORY OF PRESENT ILLNESS    Mr. Holly Cowart returns for a follow-up visit. He is a former Dr. Edwige Funez patient. He says that he is doing well today. Pt till takes Humira injections every other week. He denies injection site reactions. He denies infections. He does not take NSAIDs. Pt does not have any joint pain complaints today. He denies pain and stiffness in his lower back. He used to have middle back pain. Pt goes to the eye doctor, and he says that nothing has worsened or changed. He recalls that he had a lot of symptoms when he had his uveitis flare. Pt wife is pregnant with his second boy. He already has a 3year old son. REVIEW OF SYSTEMS    A comprehensive review of systems was performed and pertinent results are documented in the HPI, review of systems is otherwise non-contributory. PAST MEDICAL HISTORY    He has a past medical history of Ankylosing spondylitis ([facility] [address] and Uveitis.     FAMILY HISTORY    His family history includes Heart Disease in his father; Hypertension in his father; No Known Problems in his brother, mother, sister, and sister. SOCIAL HISTORY    He reports that he has never smoked. He has never used smokeless tobacco. He reports current alcohol use. He reports that he does not use drugs. MEDICATIONS    Current Outpatient Medications   Medication Sig    adalimumab (Humira,CF, Pen) 40 mg/0.4 mL injection pen INJECT 1 PEN (40 MG) UNDER THE SKIN EVERY 14 DAYS. escitalopram oxalate (LEXAPRO) 10 mg tablet Take 10 mg by mouth daily. No current facility-administered medications for this visit. ALLERGIES    No Known Allergies    PHYSICAL EXAMINATION    There were no vitals taken for this visit. There is no height or weight on file to calculate BMI. General:  The patient is well developed, well nourished, alert, and in no apparent distress. Eyes: Sclera are anicteric. No conjunctival injection. HEENT:  Oropharynx is clear. No oral ulcers. Adequate salivary pooling. No cervical or supraclavicular lymphadenopathy. Lungs:  Clear to auscultation bilaterally, without wheeze or stridor. Normal respiratory effort. Cor:  Regular rate and rhythm. No murmur rub or gallop. Abdomen: Soft, non-tender, without hepatomegaly or masses. Extremities: No calf tenderness or edema. Warm and well perfused. Skin:  No significant abnormalities. Neuro: Nonfocal  Musculoskeletal:    A comprehensive musculoskeletal exam was performed for all joints of each upper and lower extremity and assessed for swelling, tenderness and range of motion. Results are documented as below:  No evidence of synovitis in the small joints of the hands, wrists, shoulders, elbows, hips, knees or ankles. DATA REVIEW    Laboratory     Recent laboratory results were reviewed, summarized, and discussed with the patient. Imaging    Musculoskeletal Ultrasound    None    Radiographs    Thoracic Spine 12/02/2019:  There is no acute fracture or subluxation. Vertebral body heights and intervertebral disc spaces are maintained. There are no osteophytes or syndesmophytes. There is no abnormality in alignment. Lumbar Spine 12/02/2019: There is no acute fracture or subluxation. Vertebral body heights and intervertebral disc spaces are maintained. There are no osteophytes or syndesmophytes. There is no abnormality in alignment. Sacroiliac Joint 12/02/2019: no abnormality. CT Imaging    None    MR Imaging    None    DXA     None    ASSESSMENT AND PLAN    This is a follow-up visit for Mr. Lui Alvarez.     TODAY'S ORDERS    None    Future Appointments   Date Time Provider Andre Cony   12/14/2022 11:00 AM Vinod Torres MD [facility] BS AMB     Face to face time:  minutes  Note preparation and records review day of service: minutes  Total provider time day of service:  minutes PANEL, COMPREHENSIVE; Future  - QUANTIFERON-TB GOLD PLUS; Future  - ADALIMUMAB+AB; Future  - CBC WITH AUTOMATED DIFF; Future  - XR SPINE THORAC 3 V; Future    Patient Instructions   1) Continue to take every other week Humira injections. 2) Check labs ASAP and again in 6 months. 3) I am ordering Xrays for you to get on your way out today. 4) Follow up in one year. Let me know if you have any questions or concerns int he meantime. TODAY'S ORDERS    None    No future appointments. Face to face time:  15 minutes  Note preparation and records review day of service: 18 minutes  Total provider time day of service:  33 minutes     This was scribed by Deepali Rachel in the presence of Dr. Langston Boas. The note was reviewed and amended personally, and I agree with the above information.     Jessee Fitzpatrick MD    Adult Rheumatology   [facility]  A Part of [facility], [address] [facility]   Phone [phone]  Fax [phone]

## 2022-12-19 LAB
GAMMA INTERFERON BACKGROUND BLD IA-ACNC: 0.28 IU/ML
M TB IFN-G BLD-IMP: NEGATIVE
M TB IFN-G CD4+ BCKGRND COR BLD-ACNC: 0.34 IU/ML
MITOGEN IGNF BLD-ACNC: >10 IU/ML
QUANTIFERON TB2 AG: 0.33 IU/ML
SERVICE CMNT-IMP: NORMAL

## 2022-12-21 LAB
ADALIMUMAB DRUG LEVEL, 503866: 6.2 UG/ML
ALBUMIN SERPL-MCNC: 4.7 G/DL (ref 4–5)
ALBUMIN/GLOB SERPL: 1.3 {RATIO} (ref 1.2–2.2)
ALP SERPL-CCNC: 75 IU/L (ref 44–121)
ALT SERPL-CCNC: 35 IU/L (ref 0–44)
ANTI-ADALIMUMAB ANTIBODY, 503867: <25 NG/ML
AST SERPL-CCNC: 21 IU/L (ref 0–40)
BASOPHILS # BLD AUTO: 0 X10E3/UL (ref 0–0.2)
BASOPHILS NFR BLD AUTO: 0 %
BILIRUB SERPL-MCNC: 0.3 MG/DL (ref 0–1.2)
BUN SERPL-MCNC: 12 MG/DL (ref 6–20)
BUN/CREAT SERPL: 13 (ref 9–20)
CALCIUM SERPL-MCNC: 10 MG/DL (ref 8.7–10.2)
CHLORIDE SERPL-SCNC: 101 MMOL/L (ref 96–106)
CO2 SERPL-SCNC: 25 MMOL/L (ref 20–29)
CREAT SERPL-MCNC: 0.95 MG/DL (ref 0.76–1.27)
CRP SERPL-MCNC: <1 MG/L (ref 0–10)
EGFR: 110 ML/MIN/1.73
EOSINOPHIL # BLD AUTO: 0.3 X10E3/UL (ref 0–0.4)
EOSINOPHIL NFR BLD AUTO: 4 %
ERYTHROCYTE [DISTWIDTH] IN BLOOD BY AUTOMATED COUNT: 12.6 % (ref 11.6–15.4)
ERYTHROCYTE [SEDIMENTATION RATE] IN BLOOD BY WESTERGREN METHOD: 7 MM/HR (ref 0–15)
GLOBULIN SER CALC-MCNC: 3.7 G/DL (ref 1.5–4.5)
GLUCOSE SERPL-MCNC: 93 MG/DL (ref 70–99)
HCT VFR BLD AUTO: 43.3 % (ref 37.5–51)
HGB BLD-MCNC: 15.1 G/DL (ref 13–17.7)
HLA-B27 QL NAA+PROBE: POSITIVE
IMM GRANULOCYTES # BLD AUTO: 0 X10E3/UL (ref 0–0.1)
IMM GRANULOCYTES NFR BLD AUTO: 0 %
LYMPHOCYTES # BLD AUTO: 3.1 X10E3/UL (ref 0.7–3.1)
LYMPHOCYTES NFR BLD AUTO: 47 %
MCH RBC QN AUTO: 31.5 PG (ref 26.6–33)
MCHC RBC AUTO-ENTMCNC: 34.9 G/DL (ref 31.5–35.7)
MCV RBC AUTO: 90 FL (ref 79–97)
MONOCYTES # BLD AUTO: 0.4 X10E3/UL (ref 0.1–0.9)
MONOCYTES NFR BLD AUTO: 7 %
NEUTROPHILS # BLD AUTO: 2.8 X10E3/UL (ref 1.4–7)
NEUTROPHILS NFR BLD AUTO: 42 %
PLATELET # BLD AUTO: 284 X10E3/UL (ref 150–450)
POTASSIUM SERPL-SCNC: 5.4 MMOL/L (ref 3.5–5.2)
PROT SERPL-MCNC: 8.4 G/DL (ref 6–8.5)
RBC # BLD AUTO: 4.79 X10E6/UL (ref 4.14–5.8)
SODIUM SERPL-SCNC: 139 MMOL/L (ref 134–144)
WBC # BLD AUTO: 6.7 X10E3/UL (ref 3.4–10.8)

## 2022-12-28 DIAGNOSIS — H30.92 LEFT POSTERIOR UVEITIS: ICD-10-CM

## 2022-12-28 DIAGNOSIS — M45.5 ANKYLOSING SPONDYLITIS OF THORACOLUMBAR REGION (HCC): ICD-10-CM

## 2022-12-28 RX ORDER — ADALIMUMAB 40MG/0.4ML
KIT SUBCUTANEOUS
Qty: 0.8 ML | Refills: 2 | Status: ACTIVE | OUTPATIENT
Start: 2022-12-28

## 2022-12-28 NOTE — TELEPHONE ENCOUNTER
Received faxed refill request for Humira. Last visit was 12/14/22  No follow up at this time. Lab Results   Component Value Date/Time    Sodium 139 12/14/2022 12:00 AM    Potassium 5.4 (H) 12/14/2022 12:00 AM    Chloride 101 12/14/2022 12:00 AM    CO2 25 12/14/2022 12:00 AM    Glucose 93 12/14/2022 12:00 AM    BUN 12 12/14/2022 12:00 AM    Creatinine 0.95 12/14/2022 12:00 AM    BUN/Creatinine ratio 13 12/14/2022 12:00 AM    GFR est  11/20/2019 10:09 AM    GFR est non- 11/20/2019 10:09 AM    Calcium 10.0 12/14/2022 12:00 AM    Bilirubin, total 0.3 12/14/2022 12:00 AM    Alk.  phosphatase 75 12/14/2022 12:00 AM    Protein, total 8.4 12/14/2022 12:00 AM    Albumin 4.7 12/14/2022 12:00 AM    A-G Ratio 1.3 12/14/2022 12:00 AM    ALT (SGPT) 35 12/14/2022 12:00 AM    AST (SGOT) 21 12/14/2022 12:00 AM     Lab Results   Component Value Date/Time    WBC 6.7 12/14/2022 12:00 AM    HGB 15.1 12/14/2022 12:00 AM    HCT 43.3 12/14/2022 12:00 AM    PLATELET 585 94/89/8193 12:00 AM    MCV 90 12/14/2022 12:00 AM

## 2022-12-30 NOTE — PROGRESS NOTES
[address] [facility]  Dispensing Pharmacy: [address] [place] [address]    Anticipated Ship Date: 01/4/2023    Financial Assistance: No    Patient Copay: $5    Additional Notes: We have called the patient to confirm this info. Please call us with any questions at [phone].

## 2023-01-13 ENCOUNTER — TELEPHONE (OUTPATIENT)
Dept: PHARMACY | Age: 33
End: 2023-01-13

## 2023-01-13 NOTE — PROGRESS NOTES
Specialty Medication Service    Patient's Name: Randall Chaves YOB: 1990      Reason for visit: Randall Chaves is a 33 y.o. male presenting today for Specialty Medication Service visit follow up. Patient last seen by [facility] 08/18/22. Patient continues on SMS formulary medication, Humira. Pharmacy completed Specialty Medication Service visit for medication monitoring and counseling. Medication list updated. Specialty Medication: Humira 40mg/0.4ml   Frequency: every 14 days  Indication: Ankylosing spondylitis of thoracolumbar region  Initially Diagnosed: 2019  Additional Therapy:   None  Previous Therapy:   Ibuprofen      Specialist:   Alisia Toro MD   [facility]  [facility], [address]  Phone: [phone]  Specialist Progress Note Available: Yes, Epic  Last Specialist Visit: 12/14/2022: F/up visit. Patient says doing well, still taking Humira every other week, does not take NSAIDs, denies infections, injection site reactions. Denies joint pains or stiffness in lower back (used to have middle back pain). Saw eye doctor and nothing worsened or changed. Wife pregnant with second boy (has a 3 yr old son). A/P: Follow-up for Ankylosing spondylitis, in clinical remission on Humira ever 14 days. Will update plain films and labs. Continue current regimen, follow-up 1 year. No Known Allergies     Past Medical History:   Diagnosis Date    Ankylosing spondylitis ([address]     Uveitis       Social History     Tobacco Use    Smoking status: Never    Smokeless tobacco: Never   Substance Use Topics    Alcohol use: Yes     Family History   Problem Relation Age of Onset    No Known Problems Mother     Heart Disease Father     Hypertension Father     No Known Problems Sister     No Known Problems Brother     No Known Problems Sister        INTERM HISTORY  Have you been diagnosed with any additional conditions since we last talked?  No  Have you developed any new allergies since we last talked? No  Have you stopped taking any medications or supplements since we last talked? No  Have you started taking any additional medications or supplements since we last talked? No    REVIEW OF CURRENT DISEASE STATE  Ankylosing Spondylitis: Patient with diagnosis of ankylosing spondylitis here for evaluation related to specialty medication use. 2012, developed left eye inflammation and photosensitivity. Ophthalmologist diagnosed him with uveitis and was treated with topical steroids, no further issues since. At the same time he was also told he might have ankylosing spondylitis based on blood work. In 2013, he developed lower back aching pain and stiffness worse in morning and evening lasting 3-4 hours. The pain would usually wake him up at 5am and he thought it was issues with his mattress (changing mattress did not help). There was also tightness in middle back lumbar spine as well. Ibuprofen would help as well as activity and exercise (rest worsened). Patient saw rheumatologist in 2019 for evaluation. Some relief with motrin, but continued pain at night caused poor sleep. Patient was started on Humira 40mg every 14 days. Shortly after starting, patient reported feeling great, no longer waking up at night due to pain, no longer taking ibuprofen and no breakthrough symptoms. He also had improvement from baseline anthropometrics. Patient states he has remained stable since staring Humira. Initial SMS: Denies any symptoms (back pain or eye pain), might have slight lower back pain (2/10) from overuse. Rapid 3: 2, remission. States Humira changed his life. Depression being controlled with Lexapro. No questions or concerns. Recommended Shingrix, Cmlagcg59, 2nd Covid booster - patient agreeable. Overdue for TB test, will discuss with new Rheumatologist Dr. Arelis Chandra. Current SMS: Patient reports all disease symptoms are stable and tolerating Humira well.  Denies back or eye pain, so side effects or missed doses. Denies infections. No recent flares, not using NSAIDs. Patient states feels good and no concerns. Patient wanted to review results from recent labs and X-Rays. Informed patient all labs were WNL, except potassium slightly elevated. Patient denies muscle pain, weakness, chest pain, palpitations, nausea, diarrhea or abdominal pain. States he just had his labs drawn last week by PCP Mark Masterson and they have not alerted him of any abnormalities. I read patient the X-rays reports, which were normal, with only a minimal lumbar spine degenerative change. Patient had no questions or concerns. RAPID3 - Routine Assessment of Patient Index Data   Over the last week,  were you able to:   0: Without ANY difficulty  1: With SOME difficulty  2: With [facility] difficulty  3: UNABLE to do    Score   1. Dress yourself, including tying shoelaces and doing buttons? 0    Get in and out of bed? 0    Lift a full cup or glass to your mouth? 0    Walk outdoors on flat ground? 0    Wash and dry your entire body? 0    Bend down to  clothing from the floor? 0    Turn regular faucets on and off? 0    Get in and out of a car, bus, train, or airplane? 0    Walk two miles or three kilometers, if you wish? 0    Participate in recreational activities and sports as you would like, if you wish? 0    Get a good night's sleep? 0    Deal with feelings of anxiety or being nervous? 0    Deal with feelings of depression or feeling blue? 0   2. How much pain have you had because of your condition over the past week? (Scale 0 - 10) 2   3. Considering all the ways in which illness and health conditions may affect you at this time, how are you doing? (Scale 0 - 10) 4   Final Score: 6 ; low activity       · Considering all the ways in which this condition and others affects you, how are you doing (0 = very well, 10 = very poorly)?  4  · How would you rate your pain on average? (0 = no pain, 10 = worst pain imaginable) 2  · During the past 4 weeks, have you missed any planned activities of daily living due to condition (work/school/other plans)? No  · During the past 4 weeks, have you had to seek urgent care, ER admission, Unplanned doctor office visit, or hospital admission? No    MEDICATIONS  Current Outpatient Medications   Medication Sig Dispense Refill    adalimumab (Humira,CF, Pen) 40 mg/0.4 mL injection pen INJECT 1 PEN (40 MG) UNDER THE SKIN EVERY 14 DAYS. 0.8 mL 2    escitalopram oxalate (LEXAPRO) 10 mg tablet Take 10 mg by mouth daily. Current Specialty Medication:   Adalimumab (Humira)   Ankylosing Spondylitis: 40 mg every other week  Warnings to monitor for: Anaphylaxis/hypersensitivity reactions, Positive antinuclear antibody titers (even with negative baseline), Demyelinating CNS disease (new-onset or exacerbation), Heart failure (Worsening or new-onset), pancytopenia and aplastic anemia, reactivation of hepatitis B (HBV), Infections: [US Boxed Warning], Malignancy: [US Boxed Warning], Tuberculosis: [US Boxed Warning]  Recommended monitoring: Monitor improvement of symptoms and physical function assessments, signs/symptoms/worsening of heart failure; TB screening (every 6-12 months dependent upon length of therapy and other risk factors), CBC with differential (every 6 weeks for 3 months then annually); LFTs (Annually); HBV screening (annual); signs/symptoms of malignancy (eg, splenomegaly, hepatomegaly, abdominal pain, persistent fever, night sweats, weight loss). Storage: Refrigerated at 36°F to 46°F (2°C to 8°C). DO NOT FREEZE. Do not use if frozen even if it has been thawed. Handling: May store at room temperature (If needed) for up to 14 days with protection from light. Discard if not used within the 14-day period. (keep track by writing down date removed from refrigerator). Do not use beyond the expiration date on the container. Patient Reported Side Effects: itching at injection site, gone after a day. Abdominal pain: denies  Body aches, fevers, chills: denies  Infections: denies  Night sweats: denies  Unexpected weight loss: denies     Specialty Medication Start Date: 12/10/2019  Appropriate Dose: Yes  Last tuberculin Test: 12/16/2022              - Result: Negative      Describe your medication adherence over the last 4 weeks: Excellent  How many doses have you missed in the last 4 weeks, if any? 0  How confident are you to follow the injection process and the treatment plan? (0-10) 10 Who injects? self  Does the patient have a current infection of any kind? No    Contraindications to therapy present? No    Drug Interactions:  No clinically significant interactions identified via [employer] Interaction Analysis as category D or higher.  _____________________________________________________________________  Renal Dosing:  Creatinine Clearance: 123.8 ml/min per C-G Adjusted Body Weight  No renal adjustments necessary.     LABS    Component 12/14/22 0000    HLA-B27 Positive        Lab Results   Component Value Date/Time    Hep B surface Ag screen Negative 07/09/2021 11:07 AM    Hepatitis Be Antigen Negative 07/09/2021 11:07 AM    Hep B Core Ab, IgM Negative 07/09/2021 11:07 AM    Hep B Core Ab, total Negative 07/09/2021 11:07 AM    Hepatitis Be Antibody Negative 07/09/2021 11:07 AM    HEP B SURFACE AB, QUAL Reactive 07/09/2021 11:07 AM    HCV Ab 0.1 07/09/2021 11:07 AM         Lab Results   Component Value Date/Time    BUN 12 12/14/2022 12:00 AM     Lab Results   Component Value Date/Time    WBC 6.7 12/14/2022 12:00 AM    HGB 15.1 12/14/2022 12:00 AM    HCT 43.3 12/14/2022 12:00 AM    RBC 4.79 12/14/2022 12:00 AM    PLATELET 869 10/72/5023 12:00 AM     Lab Results   Component Value Date/Time    ALT (SGPT) 35 12/14/2022 12:00 AM    AST (SGOT) 21 12/14/2022 12:00 AM       IMMUNIZATIONS  Immunization History   Administered Date(s) Administered    COVID-19, PFIZER PURPLE top, DILUTE for use, (age 15 y+), IM, 30mcg/0.3mL 03/01/2021, 04/01/2021, 12/01/2021    Influenza, FLUCELVAX, (age 10 mo+), MDCK, MDV 11/22/2019    Tdap 04/04/2020      Immunization status: up to date and documented, missing doses of Vkrroet30 (immunosuppression), Shingrix (immunosuppression), Bivalent Covid booster. States has had a covid booster in past year but thinks it was prior to September. He's not sure if he has the new booster or not. ASSESSMENTS  Fall Risk Assessment, last 12 mths 8/12/2022   Able to walk? Yes   Fall in past 12 months? 0     No flowsheet data found. Igyciz94 Questions Score   In general, would you say your health is: 3   2. In general, would you say your quality of life is: 3   3. In general, how would you rate your physical health? 3   4. In general, how would you rate your mental health, including mood and your ability to think? 3   5. In general, how would you rate your satisfaction with your social activities and relationships? 3   6. In general, please rate how well you carry out your usual social activities and roles. 1   7. To what extent are you able to carry out your everyday physical activities such as walking, climbing stairs, carrying groceries, or moving a chair? 5   8. In the past 7 days, how often have you been bothered by emotional problems such as feeling anxious, depressed or irritable? 4   9. In the past 7 days, how would you rate your fatigue on average? 5   10. How would you rate your pain on average? 5   Promise Physical Score (Questions: 3, 7, 9, 10) 18   Promise Mental Score (Questions: 2, 4, 5, 8) 13      Ankylosing Spondylitis   Cyndy Tong is a 33 y.o. male being treated for Ankylosing Spondylitis. Medication reconciliation completed (Patient provided), no drug-drug interactions identified. Allergy and diagnosis info reviewed and updated. Cyndy Tong has a history remarkable for the following conditions:  Ankylosing Spondylitis, left posterior uveitis, depression  Current therapy includes: Humira 40mg every 14 days   Medication Effectiveness: Patient disease is well controlled on current therapy. No flares, recent X-rays are normal with only minimal degenerative disc changes. Patient had no questions regarding the medication's warnings, precautions, and contraindications. Confirmed appropriate storage and disposal.  Patient had no concerns with the administration process. Current disease state symptoms include: None, patient denies pain in back or eyes. States he is stable. No side effects/adverse events reported, and no adherence issues identified. Functional and cognitive limitations include: None, patient able to complete all ADL  Patient is not considered high risk. Based on patient feedback/results of the assessment, the therapy is still appropriate. No medication-related problem(s) or patient need(s) identified that would require a care plan. Follow up in 180 days      2. Immunization  Immunization status: up to date and documented, missing doses of Zlaghxi48, Shingrix, Covid bivalent booster  Optztaj33: Discussed at initial SMS visit. Patient states he is agreeable, but has been busy. Plans to obtain, no questions or concern. Follow-up next SMS  Shingrix: Patient would like to obtain based on new clinical guidelines. Will email Benefits team today to place override to allow patient to pursue thru pharmacy. Patient has no questions or concerns. Follow-up next SMS. Bivalent Covid booster: Patient states has received another booster this year, thinks it was in August.  He is not sure if it was the updated booster. Informed patient the bivalent booster became available 08/31/22 and if received before that would be the previous. Informed patient CDC would recommend he get the updated booster at this time. Patient will check with pharmacy and obtain if he is not up-to-date. Follow-up future SMS.    Tuberculin test performed in the past 6 months (if new to biologic or immunomodulator therapy) or within 12 months (if not new to therapy but with risk factors for TB)? Yes, 12/16/22    3. Drug Interaction  None    4. Other Identified Potential Issues  Slightly elevated potassium: Informed patient potassium slightly elevated, not yet hyperkalemia and patient has no signs or symptoms. Patient states just had his labs checked by PCP again 2 weeks ago and has not heard anything being out of range. Patient would like us to contact office to obtain records of new labs and alert him if his level has returned to normal. Will call PCP, Sabrina Held office today to request updated labs. Will follow-up with patient once received. PLAN  Goals of therapy, common side effects, medication storage, and administration reviewed with patient. Continue Humira 40mg under the skin ever 14 days as prescribed. Recommended monitoring to complete: None at this time. Recommended vaccinations: Djfozzz16, Shingrix, Bivalent Covid booster  Keep all scheduled appointments. Karla Fonseca, who was evaluated through a synchronous (real-time) audio only encounter, and/or his healthcare decision maker, is aware that it is a billable service, which includes applicable co-pays, with coverage as determined by his insurance carrier. He provided verbal consent to proceed and patient identification was verified. This visit was conducted pursuant to the emergency declaration under the [facility], [address] [place] [address] authority and the [employer] General Act. A caregiver was present when appropriate. Ability to conduct physical exam was limited.  The patient was located at: Home: [address] [place] [address]  The provider was located at: Home: Jocelin Meyer, [facility] on 1/18/2023 at 2:46 PM    Liyah Yan, Maribel [facility]  Ambulatory Clinical Pharmacist  Specialty Medication Services  Phone: [phone] option #4  Fax: [phone]    For Pharmacy Admin Tracking Only    Program: SMS  CPA in place: Yes  Recommendation Provided To: Patient/Caregiver: 2 via Virtual Visit  Intervention Detail: Refill(s) Provided and Vaccine Recommended/Administered  Intervention Accepted By: Patient/Caregiver: 2    Time Spent (min):  90

## 2023-01-13 NOTE — TELEPHONE ENCOUNTER
Specialty Medication Service    Date: 1/13/2023  Patient's Name: Debra Martin YOB: 1990            _____________________________________________________________________________________________     Scheduled SMS follow up with Frances Vidal on Wednesday, 1/18/23. Chart notes available in Epic. Thank you,  Jenny Blair.  Min Montes, [facility]  Clinical Pharmacist           For Pharmacy Admin Tracking Only    Program: SMS  CPA in place: Yes  Recommendation Provided To: Patient/Caregiver: 1 via Telephone  Intervention Detail: Scheduled Appointment  Intervention Accepted By: Patient/Caregiver: 1  Time Spent (min): 10

## 2023-01-18 ENCOUNTER — VIRTUAL VISIT (OUTPATIENT)
Dept: PHARMACY | Age: 33
End: 2023-01-18

## 2023-01-18 ENCOUNTER — TELEPHONE (OUTPATIENT)
Dept: PHARMACY | Age: 33
End: 2023-01-18

## 2023-01-18 DIAGNOSIS — H30.92 LEFT POSTERIOR UVEITIS: ICD-10-CM

## 2023-01-18 DIAGNOSIS — M45.5 ANKYLOSING SPONDYLITIS OF THORACOLUMBAR REGION (HCC): Primary | ICD-10-CM

## 2023-01-18 RX ORDER — ADALIMUMAB 40MG/0.4ML
KIT SUBCUTANEOUS
Qty: 0.8 ML | Refills: 1 | Status: SHIPPED | OUTPATIENT
Start: 2023-01-18

## 2023-01-18 NOTE — TELEPHONE ENCOUNTER
Specialty Medication Service    Date: 1/18/2023  Patient's Name: Susanne Elder YOB: 1990            _____________________________________________________________________________________________    Emailed Pharmacy Benefits to request approval of Shingrix x2 doses based on patient immunosuppression on Humira. Request was approved to receive thru pharmacy. Arnulfo Calles, PharmD [facility]  Ambulatory Clinical Pharmacist  Specialty Medication Services  Phone: [phone] option #4  Fax: [phone]    For Pharmacy Admin Tracking Only    Program: SMS  CPA in place: Yes  Recommendation Provided To:  Other: 1  Intervention Detail: Benefit Assistance  Intervention Accepted By: Other: 1    Time Spent (min): 15

## 2023-01-18 NOTE — TELEPHONE ENCOUNTER
Specialty Medication Service    Date: 1/18/2023  Patient's Name: Jelena Hernandez YOB: 1990            _____________________________________________________________________________________________     Amelie [place] patient's primary care provider office to request most recent  labs for Specialty Medication Service formulary medication. Wanted to follow-up to see if his potassium has returned WNL.  Left message to have faxed to [phone]    Kylah ColmenaresD [facility]  Ambulatory Clinical Pharmacist  Specialty Medication Services  Phone: [phone] option #4  Fax: [phone]    For Pharmacy Admin Tracking Only    Program: SMS  CPA in place: Yes  Recommendation Provided To: Provider: 1 via Called provider office     Intervention Accepted By: Provider: 0    Time Spent (min): 15

## 2023-01-19 NOTE — TELEPHONE ENCOUNTER
Specialty Medication Service    Date: 2023  Patient's Name: Cyndy Tong YOB: 1990            _____________________________________________________________________________________________    Received most recent labs (23) from PCP to review. Left message for patient to discuss labs received from PCP office. K was no longer elevated (4.4) on 23. However, patient does have some other lab abnormalities present:   ALT (68) - slightly elevated   TC: 215, T: HDL: 45, LDL: 124    Will forward these results to Rheumatologist Dr. Aline Vázquez for review as well. Additionally, need to talk to patient about his benefits, I just noticed his [facility] insurance benefits are terminated and need to make sure that is accurate. Benefits Department has asked we alert them if not.      Shakir Hansen, PharmD [facility]  Ambulatory Clinical Pharmacist  Specialty Medication Services  Phone: [phone] option #4  Fax: [phone]    For Pharmacy Admin Tracking Only    Program: SMS  CPA in place: Yes  Recommendation Provided To: Provider: 1 via Called provider office     Intervention Accepted By: Provider: 1    Time Spent (min): 15

## 2023-01-19 NOTE — TELEPHONE ENCOUNTER
Specialty Medication Service    Date: 1/19/2023  Patient's Name: Yesenia May YOB: 1990            _____________________________________________________________________________________________    Inbound fax received from external provider containing patient medical records requested by [facility]. Patient identifiers confirmed on each page to ensure accuracy and protection of privacy. Imported into the chart media, PharmD aware notes are available for viewing.     Oh [facility]  Clinical    Specialty Medication Service   [phone] option [facility] Only    Program: SMS  CPA in place: Yes  Recommendation Provided To: Provider: 1 via Called provider office   Intervention Accepted By: Provider: 1  Time Spent (min): 10

## 2023-01-20 DIAGNOSIS — M45.5 ANKYLOSING SPONDYLITIS OF THORACOLUMBAR REGION (HCC): ICD-10-CM

## 2023-01-20 DIAGNOSIS — Z79.899 ONGOING USE OF POSSIBLY TOXIC MEDICATION: ICD-10-CM

## 2023-01-20 DIAGNOSIS — R74.01 TRANSAMINITIS: Primary | ICD-10-CM

## 2023-01-24 ENCOUNTER — TELEPHONE (OUTPATIENT)
Dept: PHARMACY | Age: 33
End: 2023-01-24

## 2023-01-24 NOTE — TELEPHONE ENCOUNTER
Medication Management Service    Date: 1/24/2023  Patient's Name: Alexandra Aguiar YOB: 1990            _____________________________________________________________________________________________    Patient no longer Has [facility] benefits (benefits termed 01/01/2023). Patient is no longer enrolled in SMS program. No further outreach planned at this time. Did speak with patient today to confirm no longer with [facility] and patient confirmed. Told him to follow-up with Dr. Lilian Alex regarding elevated liver enzyme. Patient provided verbal understanding.      Trip Washington, PharmD [facility]  Ambulatory Clinical Pharmacist  Specialty Medication Services  Phone: [phone] option #4  Fax: [phone]    For Pharmacy Admin Tracking Only    Program: SMS  CPA in place: Yes  Recommendation Provided To: Patient/Caregiver: 1 via Telephone  Intervention Detail: Referral to Other Provider  Intervention Accepted By: Patient/Caregiver: 1    Time Spent (min): 15

## 2023-04-22 DIAGNOSIS — M45.5 ANKYLOSING SPONDYLITIS OF THORACOLUMBAR REGION (HCC): Primary | ICD-10-CM

## 2023-04-22 DIAGNOSIS — H30.92 LEFT POSTERIOR UVEITIS: ICD-10-CM

## 2023-04-22 DIAGNOSIS — Z79.899 ONGOING USE OF POSSIBLY TOXIC MEDICATION: ICD-10-CM

## 2023-04-23 DIAGNOSIS — M45.5 ANKYLOSING SPONDYLITIS OF THORACOLUMBAR REGION (HCC): Primary | ICD-10-CM

## 2023-04-23 DIAGNOSIS — H30.92 LEFT POSTERIOR UVEITIS: ICD-10-CM

## 2023-04-24 DIAGNOSIS — H30.92 LEFT POSTERIOR UVEITIS: ICD-10-CM

## 2023-04-24 DIAGNOSIS — Z79.899 ONGOING USE OF POSSIBLY TOXIC MEDICATION: ICD-10-CM

## 2023-04-24 DIAGNOSIS — M45.5 ANKYLOSING SPONDYLITIS OF THORACOLUMBAR REGION (HCC): Primary | ICD-10-CM

## 2025-04-09 ENCOUNTER — OFFICE VISIT (OUTPATIENT)
Age: 35
End: 2025-04-09

## 2025-04-09 VITALS
BODY MASS INDEX: 26.25 KG/M2 | TEMPERATURE: 98 F | DIASTOLIC BLOOD PRESSURE: 85 MMHG | WEIGHT: 188.2 LBS | OXYGEN SATURATION: 98 % | HEART RATE: 68 BPM | RESPIRATION RATE: 16 BRPM | SYSTOLIC BLOOD PRESSURE: 119 MMHG

## 2025-04-09 DIAGNOSIS — S01.511A LIP LACERATION, INITIAL ENCOUNTER: Primary | ICD-10-CM

## 2025-04-09 RX ORDER — CEPHALEXIN 500 MG/1
500 CAPSULE ORAL 3 TIMES DAILY
Qty: 21 CAPSULE | Refills: 0 | Status: SHIPPED | OUTPATIENT
Start: 2025-04-09 | End: 2025-04-16

## 2025-04-09 NOTE — PROGRESS NOTES
Resp: 16   Temp: 98 °F (36.7 °C)   TempSrc: Oral   SpO2: 98%   Weight: 85.4 kg (188 lb 3.2 oz)       No results found for this visit on 04/09/25.      Objective   Physical Exam  Vitals and nursing note reviewed.   Constitutional:       General: He is not in acute distress.     Appearance: Normal appearance. He is not ill-appearing.   HENT:      Head: Normocephalic. Laceration present.     Cardiovascular:      Rate and Rhythm: Normal rate and regular rhythm.      Pulses: Normal pulses.   Pulmonary:      Effort: Pulmonary effort is normal. No respiratory distress.   Skin:     General: Skin is warm and dry.   Neurological:      General: No focal deficit present.      Mental Status: He is alert and oriented to person, place, and time.          LACERATION REPAIR    Date/Time: 4/9/2025 12:52 PM    Performed by: Ramon Yap APRN - NP  Authorized by: Ramon Yap APRN - NP  Body area: head/neck  Location details: upper lip  Full thickness lip laceration: no  Vermilion border involved: no  Laceration length: 0.3 cm  Foreign bodies: no foreign bodies  Tendon involvement: none  Nerve involvement: none    Sedation:  Patient sedated: no    Irrigation solution: saline  Irrigation method: syringe  Amount of cleaning: standard  Debridement: none  Degree of undermining: none  Skin closure: glue  Technique: simple  Approximation: close  Approximation difficulty: simple  Patient tolerance: patient tolerated the procedure well with no immediate complications             An electronic signature was used to authenticate this note.    ANTONIO Morrison NP          PROVIDER:[TOKEN:[[id number]]]

## 2025-04-09 NOTE — PATIENT INSTRUCTIONS
Patient was seen today for an upper lip laceration  The wound is more of a puncture wound, not large or long enough to require sutures  Small amount of skin glue applied to hold wound borders together  Tdap administered as he is unsure of the last date of this  Because this was a puncture wound, will administer prophylactic antibiotics for the next several days  Keflex, 3 times daily for 7 days  Please monitor symptoms very closely, if you develop fevers, chills, rapidly spreading redness/swelling or purulent drainage, please go to the emergency department for further evaluation